# Patient Record
Sex: FEMALE | Race: BLACK OR AFRICAN AMERICAN | NOT HISPANIC OR LATINO | URBAN - METROPOLITAN AREA
[De-identification: names, ages, dates, MRNs, and addresses within clinical notes are randomized per-mention and may not be internally consistent; named-entity substitution may affect disease eponyms.]

---

## 2018-02-02 ENCOUNTER — EMERGENCY (EMERGENCY)
Facility: HOSPITAL | Age: 68
LOS: 0 days | Discharge: HOME | End: 2018-02-02
Attending: EMERGENCY MEDICINE
Payer: MEDICARE

## 2018-02-02 VITALS
OXYGEN SATURATION: 97 % | DIASTOLIC BLOOD PRESSURE: 58 MMHG | TEMPERATURE: 99 F | RESPIRATION RATE: 17 BRPM | HEART RATE: 69 BPM | SYSTOLIC BLOOD PRESSURE: 132 MMHG

## 2018-02-02 VITALS
HEIGHT: 63 IN | OXYGEN SATURATION: 100 % | DIASTOLIC BLOOD PRESSURE: 61 MMHG | HEART RATE: 66 BPM | WEIGHT: 139.99 LBS | SYSTOLIC BLOOD PRESSURE: 135 MMHG

## 2018-02-02 DIAGNOSIS — E11.9 TYPE 2 DIABETES MELLITUS WITHOUT COMPLICATIONS: ICD-10-CM

## 2018-02-02 DIAGNOSIS — E78.00 PURE HYPERCHOLESTEROLEMIA, UNSPECIFIED: ICD-10-CM

## 2018-02-02 DIAGNOSIS — R07.9 CHEST PAIN, UNSPECIFIED: ICD-10-CM

## 2018-02-02 DIAGNOSIS — I10 ESSENTIAL (PRIMARY) HYPERTENSION: ICD-10-CM

## 2018-02-02 LAB
ALBUMIN SERPL ELPH-MCNC: 3.2 G/DL — LOW (ref 3.3–5)
ALP SERPL-CCNC: 64 U/L — SIGNIFICANT CHANGE UP (ref 40–120)
ALT FLD-CCNC: 31 U/L — SIGNIFICANT CHANGE UP (ref 12–78)
ANION GAP SERPL CALC-SCNC: 11 MMOL/L — SIGNIFICANT CHANGE UP (ref 5–17)
APTT BLD: 30.9 SEC — SIGNIFICANT CHANGE UP (ref 27.5–37.4)
AST SERPL-CCNC: 26 U/L — SIGNIFICANT CHANGE UP (ref 15–37)
BASOPHILS # BLD AUTO: 0.04 K/UL — SIGNIFICANT CHANGE UP (ref 0–0.2)
BASOPHILS NFR BLD AUTO: 0.7 % — SIGNIFICANT CHANGE UP (ref 0–2)
BILIRUB SERPL-MCNC: 0.3 MG/DL — SIGNIFICANT CHANGE UP (ref 0.2–1.2)
BUN SERPL-MCNC: 24 MG/DL — HIGH (ref 7–23)
CALCIUM SERPL-MCNC: 7.8 MG/DL — LOW (ref 8.5–10.1)
CHLORIDE SERPL-SCNC: 103 MMOL/L — SIGNIFICANT CHANGE UP (ref 96–108)
CK MB BLD-MCNC: 1.3 % — SIGNIFICANT CHANGE UP (ref 0–3.5)
CK MB CFR SERPL CALC: 1.7 NG/ML — SIGNIFICANT CHANGE UP (ref 0.5–3.6)
CK SERPL-CCNC: 128 U/L — SIGNIFICANT CHANGE UP (ref 26–192)
CO2 SERPL-SCNC: 23 MMOL/L — SIGNIFICANT CHANGE UP (ref 22–31)
CREAT SERPL-MCNC: 1.3 MG/DL — SIGNIFICANT CHANGE UP (ref 0.5–1.3)
EOSINOPHIL # BLD AUTO: 0.03 K/UL — SIGNIFICANT CHANGE UP (ref 0–0.5)
EOSINOPHIL NFR BLD AUTO: 0.5 % — SIGNIFICANT CHANGE UP (ref 0–6)
GLUCOSE SERPL-MCNC: 293 MG/DL — HIGH (ref 70–99)
HCT VFR BLD CALC: 31.7 % — LOW (ref 34.5–45)
HGB BLD-MCNC: 10.4 G/DL — LOW (ref 11.5–15.5)
IMM GRANULOCYTES NFR BLD AUTO: 0.2 % — SIGNIFICANT CHANGE UP (ref 0–1.5)
INR BLD: 0.95 RATIO — SIGNIFICANT CHANGE UP (ref 0.88–1.16)
LYMPHOCYTES # BLD AUTO: 1.27 K/UL — SIGNIFICANT CHANGE UP (ref 1–3.3)
LYMPHOCYTES # BLD AUTO: 20.8 % — SIGNIFICANT CHANGE UP (ref 13–44)
MCHC RBC-ENTMCNC: 28 PG — SIGNIFICANT CHANGE UP (ref 27–34)
MCHC RBC-ENTMCNC: 32.8 GM/DL — SIGNIFICANT CHANGE UP (ref 32–36)
MCV RBC AUTO: 85.4 FL — SIGNIFICANT CHANGE UP (ref 80–100)
MONOCYTES # BLD AUTO: 0.17 K/UL — SIGNIFICANT CHANGE UP (ref 0–0.9)
MONOCYTES NFR BLD AUTO: 2.8 % — SIGNIFICANT CHANGE UP (ref 2–14)
NEUTROPHILS # BLD AUTO: 4.58 K/UL — SIGNIFICANT CHANGE UP (ref 1.8–7.4)
NEUTROPHILS NFR BLD AUTO: 75 % — SIGNIFICANT CHANGE UP (ref 43–77)
NRBC # BLD: 0 /100 WBCS — SIGNIFICANT CHANGE UP (ref 0–0)
PLATELET # BLD AUTO: 204 K/UL — SIGNIFICANT CHANGE UP (ref 150–400)
POTASSIUM SERPL-MCNC: 4.6 MMOL/L — SIGNIFICANT CHANGE UP (ref 3.5–5.3)
POTASSIUM SERPL-SCNC: 4.6 MMOL/L — SIGNIFICANT CHANGE UP (ref 3.5–5.3)
PROT SERPL-MCNC: 7 GM/DL — SIGNIFICANT CHANGE UP (ref 6–8.3)
PROTHROM AB SERPL-ACNC: 10.3 SEC — SIGNIFICANT CHANGE UP (ref 9.8–12.7)
RBC # BLD: 3.71 M/UL — LOW (ref 3.8–5.2)
RBC # FLD: 13.3 % — SIGNIFICANT CHANGE UP (ref 10.3–14.5)
SODIUM SERPL-SCNC: 137 MMOL/L — SIGNIFICANT CHANGE UP (ref 135–145)
TROPONIN I SERPL-MCNC: 0.01 NG/ML — SIGNIFICANT CHANGE UP (ref 0.01–0.04)
TROPONIN I SERPL-MCNC: <.015 NG/ML — SIGNIFICANT CHANGE UP (ref 0.01–0.04)
WBC # BLD: 6.1 K/UL — SIGNIFICANT CHANGE UP (ref 3.8–10.5)
WBC # FLD AUTO: 6.1 K/UL — SIGNIFICANT CHANGE UP (ref 3.8–10.5)

## 2018-02-02 PROCEDURE — 71045 X-RAY EXAM CHEST 1 VIEW: CPT | Mod: 26

## 2018-02-02 PROCEDURE — 93010 ELECTROCARDIOGRAM REPORT: CPT

## 2018-02-02 PROCEDURE — 99285 EMERGENCY DEPT VISIT HI MDM: CPT

## 2018-02-02 RX ORDER — INSULIN ASPART 100 [IU]/ML
0 INJECTION, SOLUTION SUBCUTANEOUS
Qty: 0 | Refills: 0 | COMMUNITY

## 2018-02-02 RX ORDER — PANTOPRAZOLE SODIUM 20 MG/1
40 TABLET, DELAYED RELEASE ORAL ONCE
Qty: 0 | Refills: 0 | Status: COMPLETED | OUTPATIENT
Start: 2018-02-02 | End: 2018-02-02

## 2018-02-02 RX ORDER — KETOROLAC TROMETHAMINE 30 MG/ML
30 SYRINGE (ML) INJECTION ONCE
Qty: 0 | Refills: 0 | Status: DISCONTINUED | OUTPATIENT
Start: 2018-02-02 | End: 2018-02-02

## 2018-02-02 RX ORDER — IBUPROFEN 200 MG
1 TABLET ORAL
Qty: 15 | Refills: 0
Start: 2018-02-02 | End: 2018-02-06

## 2018-02-02 RX ORDER — INSULIN GLARGINE 100 [IU]/ML
0 INJECTION, SOLUTION SUBCUTANEOUS
Qty: 0 | Refills: 0 | COMMUNITY

## 2018-02-02 RX ADMIN — Medication 30 MILLIGRAM(S): at 10:06

## 2018-02-02 RX ADMIN — PANTOPRAZOLE SODIUM 40 MILLIGRAM(S): 20 TABLET, DELAYED RELEASE ORAL at 10:06

## 2018-02-02 RX ADMIN — Medication 30 MILLIGRAM(S): at 10:51

## 2018-02-02 NOTE — ED PROVIDER NOTE - PROGRESS NOTE DETAILS
Pt is alert and oriented x 3 smiling denies headache, dizziness, sob, chest pain, nausea, vomiting, abd pain. Pt is given and explained all test reports and advised to see her doctor and Dr. Batista cardiologist as soon as possible.

## 2018-02-02 NOTE — ED PROVIDER NOTE - OBJECTIVE STATEMENT
67 years old female by ems c/o constant left side pressure pain 67 years old female by ems c/o constant left side pressure pain 5 to 10/10 with sob since 3:00 am this morning. Pt sts the chest pain in 5/10 now and sts the left side chest pain increases to move her left arm. Pt denies recent hx of trauma, headache, 67 years old female by ems c/o constant left side pressure pain 5 to 10/10 with sob since 3:00 am this morning. Pt sts the chest pain in 5/10 now and sts the left side chest pain increases to move her left arm. Pt denies recent hx of trauma, headache, dizziness, blurred visions, light sensitivities, cough, nausea, vomiting, fever, chills, abd pain, dysuria, or irregular bowel movements.

## 2018-02-02 NOTE — ED PROVIDER NOTE - PHYSICAL EXAMINATION
right chest wall in nontender to palp but + tender to palp, left chest wall and increases left chest pain with arms wrapped around the anterior chest

## 2018-02-02 NOTE — ED PROVIDER NOTE - CONSTITUTIONAL, MLM
normal... Well appearing, well nourished, awake, alert, oriented to person, place, time/situation and in no apparent distress. Speaking in clear full sentences no nasal flaring no shoulders retractions not holding her chest, appears very comfortable lying in the stretcher in the bright light room

## 2018-02-02 NOTE — ED ADULT TRIAGE NOTE - CHIEF COMPLAINT QUOTE
ems states, "pt c/o of chest pain like a pressure and sob that woke her up , pt given 325asa, and 1 nitro sublingual " 18gauge iv access to left ac

## 2018-04-20 ENCOUNTER — EMERGENCY (EMERGENCY)
Facility: HOSPITAL | Age: 68
LOS: 0 days | Discharge: HOME | End: 2018-04-21
Attending: EMERGENCY MEDICINE
Payer: MEDICARE

## 2018-04-20 VITALS
WEIGHT: 147.93 LBS | OXYGEN SATURATION: 99 % | TEMPERATURE: 98 F | SYSTOLIC BLOOD PRESSURE: 137 MMHG | RESPIRATION RATE: 18 BRPM | HEIGHT: 62 IN | HEART RATE: 61 BPM | DIASTOLIC BLOOD PRESSURE: 63 MMHG

## 2018-04-20 DIAGNOSIS — E78.00 PURE HYPERCHOLESTEROLEMIA, UNSPECIFIED: ICD-10-CM

## 2018-04-20 DIAGNOSIS — Z79.1 LONG TERM (CURRENT) USE OF NON-STEROIDAL ANTI-INFLAMMATORIES (NSAID): ICD-10-CM

## 2018-04-20 DIAGNOSIS — E11.649 TYPE 2 DIABETES MELLITUS WITH HYPOGLYCEMIA WITHOUT COMA: ICD-10-CM

## 2018-04-20 DIAGNOSIS — E16.2 HYPOGLYCEMIA, UNSPECIFIED: ICD-10-CM

## 2018-04-20 DIAGNOSIS — I10 ESSENTIAL (PRIMARY) HYPERTENSION: ICD-10-CM

## 2018-04-20 LAB
GLUCOSE BLDC GLUCOMTR-MCNC: 117 MG/DL — HIGH (ref 70–99)
GLUCOSE BLDC GLUCOMTR-MCNC: 133 MG/DL — HIGH (ref 70–99)
GLUCOSE BLDC GLUCOMTR-MCNC: 61 MG/DL — LOW (ref 70–99)

## 2018-04-20 PROCEDURE — 99284 EMERGENCY DEPT VISIT MOD MDM: CPT

## 2018-04-20 NOTE — ED ADULT TRIAGE NOTE - CHIEF COMPLAINT QUOTE
Pt hx DM presents to ED for hypoglycemia. Pt FS was 45 In the field. Pt received amp D50.  at triage. Pt took 16 units of Lantus and states "I didn't eat much today."

## 2018-04-20 NOTE — ED ADULT NURSE NOTE - OBJECTIVE STATEMENT
BIBA for having low blood sugar, 43 by ems, was given amp d50, patient became responsive. Patient currently aox3, denies complaint. Patient appears comfortable.

## 2018-04-21 VITALS
TEMPERATURE: 98 F | OXYGEN SATURATION: 98 % | DIASTOLIC BLOOD PRESSURE: 44 MMHG | RESPIRATION RATE: 18 BRPM | SYSTOLIC BLOOD PRESSURE: 115 MMHG | HEART RATE: 67 BPM

## 2018-04-21 PROBLEM — E78.00 PURE HYPERCHOLESTEROLEMIA, UNSPECIFIED: Chronic | Status: ACTIVE | Noted: 2018-02-02

## 2018-04-21 PROBLEM — E11.9 TYPE 2 DIABETES MELLITUS WITHOUT COMPLICATIONS: Chronic | Status: ACTIVE | Noted: 2018-02-02

## 2018-04-21 PROBLEM — I10 ESSENTIAL (PRIMARY) HYPERTENSION: Chronic | Status: ACTIVE | Noted: 2018-02-02

## 2018-04-21 LAB
ALBUMIN SERPL ELPH-MCNC: 3.3 G/DL — SIGNIFICANT CHANGE UP (ref 3.3–5)
ALP SERPL-CCNC: 62 U/L — SIGNIFICANT CHANGE UP (ref 40–120)
ALT FLD-CCNC: 26 U/L — SIGNIFICANT CHANGE UP (ref 12–78)
ANION GAP SERPL CALC-SCNC: 7 MMOL/L — SIGNIFICANT CHANGE UP (ref 5–17)
AST SERPL-CCNC: 20 U/L — SIGNIFICANT CHANGE UP (ref 15–37)
BASOPHILS # BLD AUTO: 0.06 K/UL — SIGNIFICANT CHANGE UP (ref 0–0.2)
BASOPHILS NFR BLD AUTO: 1 % — SIGNIFICANT CHANGE UP (ref 0–2)
BILIRUB SERPL-MCNC: 0.5 MG/DL — SIGNIFICANT CHANGE UP (ref 0.2–1.2)
BUN SERPL-MCNC: 26 MG/DL — HIGH (ref 7–23)
CALCIUM SERPL-MCNC: 8.6 MG/DL — SIGNIFICANT CHANGE UP (ref 8.5–10.1)
CHLORIDE SERPL-SCNC: 112 MMOL/L — HIGH (ref 96–108)
CO2 SERPL-SCNC: 25 MMOL/L — SIGNIFICANT CHANGE UP (ref 22–31)
CREAT SERPL-MCNC: 1.43 MG/DL — HIGH (ref 0.5–1.3)
EOSINOPHIL # BLD AUTO: 0.11 K/UL — SIGNIFICANT CHANGE UP (ref 0–0.5)
EOSINOPHIL NFR BLD AUTO: 1.8 % — SIGNIFICANT CHANGE UP (ref 0–6)
GLUCOSE BLDC GLUCOMTR-MCNC: 194 MG/DL — HIGH (ref 70–99)
GLUCOSE SERPL-MCNC: 78 MG/DL — SIGNIFICANT CHANGE UP (ref 70–99)
HCT VFR BLD CALC: 32.7 % — LOW (ref 34.5–45)
HGB BLD-MCNC: 10.2 G/DL — LOW (ref 11.5–15.5)
IMM GRANULOCYTES NFR BLD AUTO: 0.2 % — SIGNIFICANT CHANGE UP (ref 0–1.5)
LYMPHOCYTES # BLD AUTO: 1.59 K/UL — SIGNIFICANT CHANGE UP (ref 1–3.3)
LYMPHOCYTES # BLD AUTO: 26.4 % — SIGNIFICANT CHANGE UP (ref 13–44)
MCHC RBC-ENTMCNC: 27.6 PG — SIGNIFICANT CHANGE UP (ref 27–34)
MCHC RBC-ENTMCNC: 31.2 GM/DL — LOW (ref 32–36)
MCV RBC AUTO: 88.6 FL — SIGNIFICANT CHANGE UP (ref 80–100)
MONOCYTES # BLD AUTO: 0.37 K/UL — SIGNIFICANT CHANGE UP (ref 0–0.9)
MONOCYTES NFR BLD AUTO: 6.1 % — SIGNIFICANT CHANGE UP (ref 2–14)
NEUTROPHILS # BLD AUTO: 3.89 K/UL — SIGNIFICANT CHANGE UP (ref 1.8–7.4)
NEUTROPHILS NFR BLD AUTO: 64.5 % — SIGNIFICANT CHANGE UP (ref 43–77)
NRBC # BLD: 0 /100 WBCS — SIGNIFICANT CHANGE UP (ref 0–0)
PLATELET # BLD AUTO: 186 K/UL — SIGNIFICANT CHANGE UP (ref 150–400)
POTASSIUM SERPL-MCNC: 4.1 MMOL/L — SIGNIFICANT CHANGE UP (ref 3.5–5.3)
POTASSIUM SERPL-SCNC: 4.1 MMOL/L — SIGNIFICANT CHANGE UP (ref 3.5–5.3)
PROT SERPL-MCNC: 7.1 GM/DL — SIGNIFICANT CHANGE UP (ref 6–8.3)
RBC # BLD: 3.69 M/UL — LOW (ref 3.8–5.2)
RBC # FLD: 13.4 % — SIGNIFICANT CHANGE UP (ref 10.3–14.5)
SODIUM SERPL-SCNC: 144 MMOL/L — SIGNIFICANT CHANGE UP (ref 135–145)
WBC # BLD: 6.03 K/UL — SIGNIFICANT CHANGE UP (ref 3.8–10.5)
WBC # FLD AUTO: 6.03 K/UL — SIGNIFICANT CHANGE UP (ref 3.8–10.5)

## 2018-04-21 NOTE — ED PROVIDER NOTE - OBJECTIVE STATEMENT
Pt is a 66 yo lady with a pmhx of HTN, HL, IDDM2 who presents to the ED with hypoglycemia. She took 20 U novolog around 7 PM, and did not eat full dinner. Started feeling warm, lightheaded, FS in 40s at home. Given d50. Now is feeling well, no recent fevers, cough, dysuria. No abdominal pain, no chest pain.

## 2018-04-21 NOTE — ED PROVIDER NOTE - PROGRESS NOTE DETAILS
Pt has remained stable during ED, ok for d/c. Will take caution regarding insulin use, will junito w pmd.

## 2019-05-08 ENCOUNTER — EMERGENCY (EMERGENCY)
Facility: HOSPITAL | Age: 69
LOS: 0 days | Discharge: HOME | End: 2019-05-08
Attending: EMERGENCY MEDICINE
Payer: MEDICARE

## 2019-05-08 VITALS
OXYGEN SATURATION: 99 % | HEIGHT: 62 IN | RESPIRATION RATE: 18 BRPM | DIASTOLIC BLOOD PRESSURE: 72 MMHG | SYSTOLIC BLOOD PRESSURE: 120 MMHG | WEIGHT: 139.99 LBS | HEART RATE: 60 BPM | TEMPERATURE: 99 F

## 2019-05-08 VITALS
OXYGEN SATURATION: 99 % | RESPIRATION RATE: 16 BRPM | DIASTOLIC BLOOD PRESSURE: 75 MMHG | SYSTOLIC BLOOD PRESSURE: 121 MMHG | HEART RATE: 66 BPM | TEMPERATURE: 98 F

## 2019-05-08 DIAGNOSIS — E11.9 TYPE 2 DIABETES MELLITUS WITHOUT COMPLICATIONS: ICD-10-CM

## 2019-05-08 DIAGNOSIS — R51 HEADACHE: ICD-10-CM

## 2019-05-08 DIAGNOSIS — K08.89 OTHER SPECIFIED DISORDERS OF TEETH AND SUPPORTING STRUCTURES: ICD-10-CM

## 2019-05-08 DIAGNOSIS — I10 ESSENTIAL (PRIMARY) HYPERTENSION: ICD-10-CM

## 2019-05-08 DIAGNOSIS — M54.2 CERVICALGIA: ICD-10-CM

## 2019-05-08 PROCEDURE — 99283 EMERGENCY DEPT VISIT LOW MDM: CPT

## 2019-05-08 RX ORDER — LIDOCAINE 4 G/100G
5 CREAM TOPICAL ONCE
Qty: 0 | Refills: 0 | Status: COMPLETED | OUTPATIENT
Start: 2019-05-08 | End: 2019-05-08

## 2019-05-08 RX ORDER — ACETAMINOPHEN 500 MG
650 TABLET ORAL ONCE
Qty: 0 | Refills: 0 | Status: COMPLETED | OUTPATIENT
Start: 2019-05-08 | End: 2019-05-08

## 2019-05-08 RX ADMIN — Medication 650 MILLIGRAM(S): at 14:00

## 2019-05-08 RX ADMIN — LIDOCAINE 5 MILLILITER(S): 4 CREAM TOPICAL at 14:22

## 2019-05-08 NOTE — ED PROVIDER NOTE - PHYSICAL EXAMINATION
Gen: Alert, Well appearing. NAD    Head: NC, AT, PERRL, EOMI, normal lids/conjunctiva   ENT: patent oropharynx without erythema/exudate, uvula midline, + upper and lower aligners. no trismus, no ludwigs.  Neck: supple, no tenderness/meningismus  Pulm: Bilateral clear BS, normal resp effort, no wheeze/stridor/retractions  CV: RRR, no M/R/G, +dist pulses   Abd: soft, NT/ND, +BS, no guarding/rebound tenderness  Mskel: no edema/erythema/cyanosis   Skin: no rash   Neuro: AAOx3, no sensory/motor deficits, CN 2-12 intact

## 2019-05-08 NOTE — ED ADULT NURSE NOTE - OBJECTIVE STATEMENT
Pti s a 68YOF who is here for pain in her mouth, pt states the pain in her mouth got worse after a dental cleaning.

## 2019-05-08 NOTE — ED ADULT NURSE REASSESSMENT NOTE - NS ED NURSE REASSESS COMMENT FT1
Pt able to ambulate safely and steadily w/out assistance, denies dizziness/weakness upon standing, pt discharged home and paperwork was signed, reviewed with patient. Vital Signs recorded in the EMR, pt given follow up instructions and discharge treatment plan. Pt education deemed successful at time of discharge after teach back proves proficiency. Pt has no distress at time of discharge, pt provided discharge instructions, follow up care and results reviewed by MD. Reinforced by the RN at time of discharge.

## 2019-05-08 NOTE — ED PROVIDER NOTE - CLINICAL SUMMARY MEDICAL DECISION MAKING FREE TEXT BOX
pt nontoxic, likely benign headache, had CT last month for similar headache. likely also component of dental pain, for viscous lidocaine. dc to fu with dental and neuro.

## 2019-05-08 NOTE — ED PROVIDER NOTE - OBJECTIVE STATEMENT
67yo female with pmh DM, HTN presents with dental pain after dentist visit with anesthesia, cleaning, 6 days ago. States then goes to generalized headache and neck pain. Pt had similar headache last month and received a CT and told she needed to see dentist. pt also did fu with neuro who states everything was okay. no fever, change in vision, dizziness, cp, sob, palpitations. headache resolves with tylenol but starts up again.    ROS: No fever/chills. No photophobia/eye pain/changes in vision, No ear pain/sore throat/dysphagia, No chest pain/palpitations. No SOB/cough/stridor. No abdominal pain, N/V/D, no black/bloody bm. No dysuria/frequency/discharge, + headache. No Dizziness.  No rash.  No numbness/tingling/weakness.

## 2019-05-08 NOTE — ED ADULT NURSE NOTE - NSIMPLEMENTINTERV_GEN_ALL_ED
Implemented All Universal Safety Interventions:  [place] to call system. Call bell, personal items and telephone within reach. Instruct patient to call for assistance. Room bathroom lighting operational. Non-slip footwear when patient is off stretcher. Physically safe environment: no spills, clutter or unnecessary equipment. Stretcher in lowest position, wheels locked, appropriate side rails in place.

## 2020-01-04 ENCOUNTER — INPATIENT (INPATIENT)
Facility: HOSPITAL | Age: 70
LOS: 3 days | Discharge: HOME | End: 2020-01-08
Attending: STUDENT IN AN ORGANIZED HEALTH CARE EDUCATION/TRAINING PROGRAM | Admitting: STUDENT IN AN ORGANIZED HEALTH CARE EDUCATION/TRAINING PROGRAM
Payer: MEDICARE

## 2020-01-04 VITALS
RESPIRATION RATE: 16 BRPM | SYSTOLIC BLOOD PRESSURE: 154 MMHG | HEIGHT: 62 IN | OXYGEN SATURATION: 98 % | DIASTOLIC BLOOD PRESSURE: 59 MMHG | WEIGHT: 147.93 LBS | TEMPERATURE: 98 F | HEART RATE: 72 BPM

## 2020-01-04 LAB
ALBUMIN SERPL ELPH-MCNC: 3.4 G/DL — SIGNIFICANT CHANGE UP (ref 3.3–5)
ALP SERPL-CCNC: 55 U/L — SIGNIFICANT CHANGE UP (ref 40–120)
ALT FLD-CCNC: 38 U/L — SIGNIFICANT CHANGE UP (ref 12–78)
ANION GAP SERPL CALC-SCNC: 6 MMOL/L — SIGNIFICANT CHANGE UP (ref 5–17)
AST SERPL-CCNC: 23 U/L — SIGNIFICANT CHANGE UP (ref 15–37)
BILIRUB SERPL-MCNC: 0.4 MG/DL — SIGNIFICANT CHANGE UP (ref 0.2–1.2)
BUN SERPL-MCNC: 18 MG/DL — SIGNIFICANT CHANGE UP (ref 7–23)
CALCIUM SERPL-MCNC: 8.6 MG/DL — SIGNIFICANT CHANGE UP (ref 8.5–10.1)
CHLORIDE SERPL-SCNC: 107 MMOL/L — SIGNIFICANT CHANGE UP (ref 96–108)
CO2 SERPL-SCNC: 26 MMOL/L — SIGNIFICANT CHANGE UP (ref 22–31)
CREAT SERPL-MCNC: 1.42 MG/DL — HIGH (ref 0.5–1.3)
D DIMER BLD IA.RAPID-MCNC: 239 NG/ML DDU — HIGH
GLUCOSE BLDC GLUCOMTR-MCNC: 178 MG/DL — HIGH (ref 70–99)
GLUCOSE SERPL-MCNC: 181 MG/DL — HIGH (ref 70–99)
HCT VFR BLD CALC: 27.5 % — LOW (ref 34.5–45)
HGB BLD-MCNC: 8.8 G/DL — LOW (ref 11.5–15.5)
INR BLD: 1.04 RATIO — SIGNIFICANT CHANGE UP (ref 0.88–1.16)
MCHC RBC-ENTMCNC: 27.7 PG — SIGNIFICANT CHANGE UP (ref 27–34)
MCHC RBC-ENTMCNC: 32 GM/DL — SIGNIFICANT CHANGE UP (ref 32–36)
MCV RBC AUTO: 86.5 FL — SIGNIFICANT CHANGE UP (ref 80–100)
NRBC # BLD: 0 /100 WBCS — SIGNIFICANT CHANGE UP (ref 0–0)
NT-PROBNP SERPL-SCNC: 778 PG/ML — HIGH (ref 0–125)
PLATELET # BLD AUTO: 232 K/UL — SIGNIFICANT CHANGE UP (ref 150–400)
POTASSIUM SERPL-MCNC: 4.2 MMOL/L — SIGNIFICANT CHANGE UP (ref 3.5–5.3)
POTASSIUM SERPL-SCNC: 4.2 MMOL/L — SIGNIFICANT CHANGE UP (ref 3.5–5.3)
PROT SERPL-MCNC: 7.1 GM/DL — SIGNIFICANT CHANGE UP (ref 6–8.3)
PROTHROM AB SERPL-ACNC: 11.7 SEC — SIGNIFICANT CHANGE UP (ref 10–12.9)
RBC # BLD: 3.18 M/UL — LOW (ref 3.8–5.2)
RBC # FLD: 13.4 % — SIGNIFICANT CHANGE UP (ref 10.3–14.5)
SODIUM SERPL-SCNC: 139 MMOL/L — SIGNIFICANT CHANGE UP (ref 135–145)
TROPONIN I SERPL-MCNC: <.015 NG/ML — SIGNIFICANT CHANGE UP (ref 0.01–0.04)
WBC # BLD: 5.62 K/UL — SIGNIFICANT CHANGE UP (ref 3.8–10.5)
WBC # FLD AUTO: 5.62 K/UL — SIGNIFICANT CHANGE UP (ref 3.8–10.5)

## 2020-01-04 PROCEDURE — 99284 EMERGENCY DEPT VISIT MOD MDM: CPT

## 2020-01-04 PROCEDURE — 71275 CT ANGIOGRAPHY CHEST: CPT | Mod: 26

## 2020-01-04 PROCEDURE — 71046 X-RAY EXAM CHEST 2 VIEWS: CPT | Mod: 26

## 2020-01-04 PROCEDURE — 93010 ELECTROCARDIOGRAM REPORT: CPT

## 2020-01-04 RX ORDER — INSULIN GLARGINE 100 [IU]/ML
10 INJECTION, SOLUTION SUBCUTANEOUS
Qty: 0 | Refills: 0 | DISCHARGE

## 2020-01-04 RX ORDER — INSULIN ASPART 100 [IU]/ML
16 INJECTION, SOLUTION SUBCUTANEOUS
Qty: 0 | Refills: 0 | DISCHARGE

## 2020-01-04 NOTE — ED ADULT NURSE NOTE - ED STAT RN HANDOFF DETAILS
report taken from RN sabrina. pt c/o chest pain and SOB. Dr. cole made aware. Pt on 2L o2 via nasal cannula. will continue to monitor. awaiting to be seen by MD.

## 2020-01-04 NOTE — ED PROVIDER NOTE - OBJECTIVE STATEMENT
69 year old female with PMH of HTN, HLD, DM II presenting to ED due to SOB on exertion worse over last few days, pt thought she had fever and cough and was seen at [facility] and was sent home -noting some fluid in lungs. Pt was not placed on lasix or given cardiology follow up.

## 2020-01-04 NOTE — ED ADULT NURSE NOTE - CHIEF COMPLAINT QUOTE
pt states she is having sob , body ache and fever for one week. pt was recently seen in [facility] for fluid in the lungs .

## 2020-01-04 NOTE — ED ADULT NURSE NOTE - NSIMPLEMENTINTERV_GEN_ALL_ED
Implemented All Fall with Harm Risk Interventions:  [place] to call system. Call bell, personal items and telephone within reach. Instruct patient to call for assistance. Room bathroom lighting operational. Non-slip footwear when patient is off stretcher. Physically safe environment: no spills, clutter or unnecessary equipment. Stretcher in lowest position, wheels locked, appropriate side rails in place. Provide visual cue, wrist band, yellow gown, etc. Monitor gait and stability. Monitor for mental status changes and reorient to person, place, and time. Review medications for side effects contributing to fall risk. Reinforce activity limits and safety measures with patient and family. Provide visual clues: red socks.

## 2020-01-05 DIAGNOSIS — E78.00 PURE HYPERCHOLESTEROLEMIA, UNSPECIFIED: ICD-10-CM

## 2020-01-05 DIAGNOSIS — I50.9 HEART FAILURE, UNSPECIFIED: ICD-10-CM

## 2020-01-05 DIAGNOSIS — E11.65 TYPE 2 DIABETES MELLITUS WITH HYPERGLYCEMIA: ICD-10-CM

## 2020-01-05 DIAGNOSIS — I10 ESSENTIAL (PRIMARY) HYPERTENSION: ICD-10-CM

## 2020-01-05 DIAGNOSIS — Z29.9 ENCOUNTER FOR PROPHYLACTIC MEASURES, UNSPECIFIED: ICD-10-CM

## 2020-01-05 DIAGNOSIS — E27.8 OTHER SPECIFIED DISORDERS OF ADRENAL GLAND: ICD-10-CM

## 2020-01-05 LAB
ANION GAP SERPL CALC-SCNC: 7 MMOL/L — SIGNIFICANT CHANGE UP (ref 5–17)
BASOPHILS # BLD AUTO: 0.04 K/UL — SIGNIFICANT CHANGE UP (ref 0–0.2)
BASOPHILS NFR BLD AUTO: 0.8 % — SIGNIFICANT CHANGE UP (ref 0–2)
BUN SERPL-MCNC: 14 MG/DL — SIGNIFICANT CHANGE UP (ref 7–23)
CALCIUM SERPL-MCNC: 8.7 MG/DL — SIGNIFICANT CHANGE UP (ref 8.5–10.1)
CHLORIDE SERPL-SCNC: 106 MMOL/L — SIGNIFICANT CHANGE UP (ref 96–108)
CK SERPL-CCNC: 179 U/L — SIGNIFICANT CHANGE UP (ref 26–192)
CO2 SERPL-SCNC: 28 MMOL/L — SIGNIFICANT CHANGE UP (ref 22–31)
CREAT SERPL-MCNC: 1.28 MG/DL — SIGNIFICANT CHANGE UP (ref 0.5–1.3)
EOSINOPHIL # BLD AUTO: 0.16 K/UL — SIGNIFICANT CHANGE UP (ref 0–0.5)
EOSINOPHIL NFR BLD AUTO: 3.4 % — SIGNIFICANT CHANGE UP (ref 0–6)
FERRITIN SERPL-MCNC: 62 NG/ML — SIGNIFICANT CHANGE UP (ref 15–150)
FOLATE SERPL-MCNC: 19.7 NG/ML — SIGNIFICANT CHANGE UP
GLUCOSE BLDC GLUCOMTR-MCNC: 139 MG/DL — HIGH (ref 70–99)
GLUCOSE SERPL-MCNC: 151 MG/DL — HIGH (ref 70–99)
HBA1C BLD-MCNC: 6.8 % — HIGH (ref 4–5.6)
HCT VFR BLD CALC: 26.9 % — LOW (ref 34.5–45)
HGB BLD-MCNC: 8.6 G/DL — LOW (ref 11.5–15.5)
IMM GRANULOCYTES NFR BLD AUTO: 0.6 % — SIGNIFICANT CHANGE UP (ref 0–1.5)
IRON SATN MFR SERPL: 16 % — SIGNIFICANT CHANGE UP (ref 14–50)
IRON SATN MFR SERPL: 42 UG/DL — SIGNIFICANT CHANGE UP (ref 30–160)
LDH SERPL L TO P-CCNC: 290 U/L — HIGH (ref 50–242)
LYMPHOCYTES # BLD AUTO: 1.14 K/UL — SIGNIFICANT CHANGE UP (ref 1–3.3)
LYMPHOCYTES # BLD AUTO: 24.1 % — SIGNIFICANT CHANGE UP (ref 13–44)
MAGNESIUM SERPL-MCNC: 2.2 MG/DL — SIGNIFICANT CHANGE UP (ref 1.6–2.6)
MCHC RBC-ENTMCNC: 28 PG — SIGNIFICANT CHANGE UP (ref 27–34)
MCHC RBC-ENTMCNC: 32 GM/DL — SIGNIFICANT CHANGE UP (ref 32–36)
MCV RBC AUTO: 87.6 FL — SIGNIFICANT CHANGE UP (ref 80–100)
MONOCYTES # BLD AUTO: 0.44 K/UL — SIGNIFICANT CHANGE UP (ref 0–0.9)
MONOCYTES NFR BLD AUTO: 9.3 % — SIGNIFICANT CHANGE UP (ref 2–14)
NEUTROPHILS # BLD AUTO: 2.92 K/UL — SIGNIFICANT CHANGE UP (ref 1.8–7.4)
NEUTROPHILS NFR BLD AUTO: 61.8 % — SIGNIFICANT CHANGE UP (ref 43–77)
NRBC # BLD: 0 /100 WBCS — SIGNIFICANT CHANGE UP (ref 0–0)
NT-PROBNP SERPL-SCNC: 932 PG/ML — HIGH (ref 0–125)
PHOSPHATE SERPL-MCNC: 4.5 MG/DL — SIGNIFICANT CHANGE UP (ref 2.5–4.5)
PLATELET # BLD AUTO: 216 K/UL — SIGNIFICANT CHANGE UP (ref 150–400)
POTASSIUM SERPL-MCNC: 3.9 MMOL/L — SIGNIFICANT CHANGE UP (ref 3.5–5.3)
POTASSIUM SERPL-SCNC: 3.9 MMOL/L — SIGNIFICANT CHANGE UP (ref 3.5–5.3)
RBC # BLD: 3.07 M/UL — LOW (ref 3.8–5.2)
RBC # FLD: 13.6 % — SIGNIFICANT CHANGE UP (ref 10.3–14.5)
SODIUM SERPL-SCNC: 141 MMOL/L — SIGNIFICANT CHANGE UP (ref 135–145)
T3 SERPL-MCNC: 95 NG/DL — SIGNIFICANT CHANGE UP (ref 80–200)
T4 AB SER-ACNC: 6.8 UG/DL — SIGNIFICANT CHANGE UP (ref 4.6–12)
TIBC SERPL-MCNC: 267 UG/DL — SIGNIFICANT CHANGE UP (ref 220–430)
TROPONIN I SERPL-MCNC: <.015 NG/ML — SIGNIFICANT CHANGE UP (ref 0.01–0.04)
TSH SERPL-MCNC: 2.27 UIU/ML — SIGNIFICANT CHANGE UP (ref 0.36–3.74)
UIBC SERPL-MCNC: 225 UG/DL — SIGNIFICANT CHANGE UP (ref 110–370)
VIT B12 SERPL-MCNC: 495 PG/ML — SIGNIFICANT CHANGE UP (ref 232–1245)
WBC # BLD: 4.73 K/UL — SIGNIFICANT CHANGE UP (ref 3.8–10.5)
WBC # FLD AUTO: 4.73 K/UL — SIGNIFICANT CHANGE UP (ref 3.8–10.5)

## 2020-01-05 PROCEDURE — 99223 1ST HOSP IP/OBS HIGH 75: CPT | Mod: AI

## 2020-01-05 PROCEDURE — 12345: CPT | Mod: NC

## 2020-01-05 RX ORDER — ENOXAPARIN SODIUM 100 MG/ML
40 INJECTION SUBCUTANEOUS DAILY
Refills: 0 | Status: DISCONTINUED | OUTPATIENT
Start: 2020-01-05 | End: 2020-01-08

## 2020-01-05 RX ORDER — DOXAZOSIN MESYLATE 4 MG
1 TABLET ORAL
Qty: 0 | Refills: 0 | DISCHARGE

## 2020-01-05 RX ORDER — INSULIN LISPRO 100/ML
VIAL (ML) SUBCUTANEOUS
Refills: 0 | Status: DISCONTINUED | OUTPATIENT
Start: 2020-01-05 | End: 2020-01-08

## 2020-01-05 RX ORDER — DOXAZOSIN MESYLATE 4 MG
2 TABLET ORAL AT BEDTIME
Refills: 0 | Status: DISCONTINUED | OUTPATIENT
Start: 2020-01-05 | End: 2020-01-08

## 2020-01-05 RX ORDER — ACETAMINOPHEN 500 MG
650 TABLET ORAL ONCE
Refills: 0 | Status: COMPLETED | OUTPATIENT
Start: 2020-01-05 | End: 2020-01-05

## 2020-01-05 RX ORDER — SODIUM CHLORIDE 9 MG/ML
1000 INJECTION, SOLUTION INTRAVENOUS
Refills: 0 | Status: DISCONTINUED | OUTPATIENT
Start: 2020-01-05 | End: 2020-01-08

## 2020-01-05 RX ORDER — LANOLIN ALCOHOL/MO/W.PET/CERES
3 CREAM (GRAM) TOPICAL AT BEDTIME
Refills: 0 | Status: DISCONTINUED | OUTPATIENT
Start: 2020-01-05 | End: 2020-01-08

## 2020-01-05 RX ORDER — HYDRALAZINE HCL 50 MG
1 TABLET ORAL
Qty: 0 | Refills: 0 | DISCHARGE

## 2020-01-05 RX ORDER — SIMVASTATIN 20 MG/1
20 TABLET, FILM COATED ORAL AT BEDTIME
Refills: 0 | Status: DISCONTINUED | OUTPATIENT
Start: 2020-01-05 | End: 2020-01-08

## 2020-01-05 RX ORDER — INSULIN LISPRO 100/ML
5 VIAL (ML) SUBCUTANEOUS
Refills: 0 | Status: DISCONTINUED | OUTPATIENT
Start: 2020-01-05 | End: 2020-01-08

## 2020-01-05 RX ORDER — AMLODIPINE BESYLATE 2.5 MG/1
0 TABLET ORAL
Qty: 0 | Refills: 0 | DISCHARGE

## 2020-01-05 RX ORDER — SIMVASTATIN 20 MG/1
20 TABLET, FILM COATED ORAL
Qty: 0 | Refills: 0 | DISCHARGE

## 2020-01-05 RX ORDER — ASPIRIN/CALCIUM CARB/MAGNESIUM 324 MG
1 TABLET ORAL
Qty: 0 | Refills: 0 | DISCHARGE

## 2020-01-05 RX ORDER — ASPIRIN/CALCIUM CARB/MAGNESIUM 324 MG
81 TABLET ORAL DAILY
Refills: 0 | Status: DISCONTINUED | OUTPATIENT
Start: 2020-01-05 | End: 2020-01-08

## 2020-01-05 RX ORDER — CARVEDILOL PHOSPHATE 80 MG/1
1 CAPSULE, EXTENDED RELEASE ORAL
Qty: 0 | Refills: 0 | DISCHARGE

## 2020-01-05 RX ORDER — AMLODIPINE BESYLATE 2.5 MG/1
10 TABLET ORAL
Qty: 0 | Refills: 0 | DISCHARGE

## 2020-01-05 RX ORDER — DEXTROSE 50 % IN WATER 50 %
12.5 SYRINGE (ML) INTRAVENOUS ONCE
Refills: 0 | Status: DISCONTINUED | OUTPATIENT
Start: 2020-01-05 | End: 2020-01-08

## 2020-01-05 RX ORDER — INSULIN GLARGINE 100 [IU]/ML
20 INJECTION, SOLUTION SUBCUTANEOUS AT BEDTIME
Refills: 0 | Status: DISCONTINUED | OUTPATIENT
Start: 2020-01-05 | End: 2020-01-06

## 2020-01-05 RX ORDER — SIMVASTATIN 20 MG/1
0 TABLET, FILM COATED ORAL
Qty: 0 | Refills: 0 | DISCHARGE

## 2020-01-05 RX ORDER — CARVEDILOL PHOSPHATE 80 MG/1
25 CAPSULE, EXTENDED RELEASE ORAL EVERY 12 HOURS
Refills: 0 | Status: DISCONTINUED | OUTPATIENT
Start: 2020-01-05 | End: 2020-01-08

## 2020-01-05 RX ORDER — DEXTROSE 50 % IN WATER 50 %
25 SYRINGE (ML) INTRAVENOUS ONCE
Refills: 0 | Status: DISCONTINUED | OUTPATIENT
Start: 2020-01-05 | End: 2020-01-08

## 2020-01-05 RX ORDER — FUROSEMIDE 40 MG
40 TABLET ORAL ONCE
Refills: 0 | Status: COMPLETED | OUTPATIENT
Start: 2020-01-05 | End: 2020-01-05

## 2020-01-05 RX ORDER — HYDRALAZINE HCL 50 MG
50 TABLET ORAL EVERY 8 HOURS
Refills: 0 | Status: DISCONTINUED | OUTPATIENT
Start: 2020-01-05 | End: 2020-01-08

## 2020-01-05 RX ORDER — GLUCAGON INJECTION, SOLUTION 0.5 MG/.1ML
1 INJECTION, SOLUTION SUBCUTANEOUS ONCE
Refills: 0 | Status: DISCONTINUED | OUTPATIENT
Start: 2020-01-05 | End: 2020-01-08

## 2020-01-05 RX ORDER — FUROSEMIDE 40 MG
20 TABLET ORAL DAILY
Refills: 0 | Status: DISCONTINUED | OUTPATIENT
Start: 2020-01-05 | End: 2020-01-06

## 2020-01-05 RX ORDER — AMLODIPINE BESYLATE 2.5 MG/1
10 TABLET ORAL DAILY
Refills: 0 | Status: DISCONTINUED | OUTPATIENT
Start: 2020-01-05 | End: 2020-01-08

## 2020-01-05 RX ORDER — DEXTROSE 50 % IN WATER 50 %
15 SYRINGE (ML) INTRAVENOUS ONCE
Refills: 0 | Status: DISCONTINUED | OUTPATIENT
Start: 2020-01-05 | End: 2020-01-08

## 2020-01-05 RX ORDER — LOSARTAN POTASSIUM 100 MG/1
0 TABLET, FILM COATED ORAL
Qty: 0 | Refills: 0 | DISCHARGE

## 2020-01-05 RX ADMIN — Medication 650 MILLIGRAM(S): at 16:37

## 2020-01-05 RX ADMIN — CARVEDILOL PHOSPHATE 25 MILLIGRAM(S): 80 CAPSULE, EXTENDED RELEASE ORAL at 05:45

## 2020-01-05 RX ADMIN — Medication 50 MILLIGRAM(S): at 21:44

## 2020-01-05 RX ADMIN — Medication 5 UNIT(S): at 08:19

## 2020-01-05 RX ADMIN — Medication 2: at 21:47

## 2020-01-05 RX ADMIN — INSULIN GLARGINE 20 UNIT(S): 100 INJECTION, SOLUTION SUBCUTANEOUS at 21:45

## 2020-01-05 RX ADMIN — SIMVASTATIN 20 MILLIGRAM(S): 20 TABLET, FILM COATED ORAL at 21:44

## 2020-01-05 RX ADMIN — Medication 40 MILLIGRAM(S): at 00:41

## 2020-01-05 RX ADMIN — CARVEDILOL PHOSPHATE 25 MILLIGRAM(S): 80 CAPSULE, EXTENDED RELEASE ORAL at 17:25

## 2020-01-05 RX ADMIN — Medication 2 MILLIGRAM(S): at 21:42

## 2020-01-05 RX ADMIN — ENOXAPARIN SODIUM 40 MILLIGRAM(S): 100 INJECTION SUBCUTANEOUS at 11:18

## 2020-01-05 RX ADMIN — Medication 5 UNIT(S): at 17:25

## 2020-01-05 RX ADMIN — Medication 50 MILLIGRAM(S): at 13:23

## 2020-01-05 RX ADMIN — AMLODIPINE BESYLATE 10 MILLIGRAM(S): 2.5 TABLET ORAL at 05:45

## 2020-01-05 RX ADMIN — Medication 3 MILLIGRAM(S): at 21:44

## 2020-01-05 RX ADMIN — Medication 50 MILLIGRAM(S): at 05:45

## 2020-01-05 RX ADMIN — Medication 650 MILLIGRAM(S): at 17:30

## 2020-01-05 RX ADMIN — Medication 3 MILLIGRAM(S): at 03:32

## 2020-01-05 RX ADMIN — Medication 81 MILLIGRAM(S): at 11:17

## 2020-01-05 RX ADMIN — Medication 5 UNIT(S): at 11:57

## 2020-01-05 NOTE — CONSULT NOTE ADULT - SUBJECTIVE AND OBJECTIVE BOX
HPI:  HPI:  Pt is a 69 year old female with PMH of HTN, HLD, DM II presenting to ED for SOB on exertion worse over last few days, pt thought she had fever and cough and was seen at [facility] ed w/o f/u few days ago. Pt reports  for past several months she has been more fatigued and SOB. over the past 2 weeks she began having LE edema. and over the past 1 week she noticed Orthopnea. Additionally she reports not sleeping for the past 4 days, unable to specify why. Pt denies any fever, chills,cp, palpitations, n/v/d/c no travels or sick contacts. (2020 07:05)      Chief Complaint:  Patient is a 69y old  Female who presents with a chief complaint of sob (2020 07:05)      Review of Systems:    General:  No wt loss, fevers, chills, night sweats  Eyes:  Good vision, no reported pain  ENT:  No sore throat, pain, runny nose, dysphagia  CV:  No pain, palpitations, hypo/hypertension  Resp:   dyspnea  GI:  No pain, nausea, vomiting, diarrhea, constipation           Social History/Family History  SOCHX:   tobacco,  -  alcohol    FMHX: FA/MO  - contributory       Discussed with:  PMD, Family    Physical Exam:    Vital Signs:  Vital Signs Last 24 Hrs  T(C): 36.3 (2020 16:35), Max: 37.2 (2020 01:24)  T(F): 97.3 (2020 16:35), Max: 99 (2020 01:24)  HR: 64 (2020 16:35) (60 - 77)  BP: 147/55 (2020 16:35) (125/63 - 160/62)  BP(mean): --  RR: 17 (2020 16:35) (16 - 20)  SpO2: 97% (2020 16:35) (95% - 99%)  Daily Height in cm: 157.48 (2020 02:20)    Daily Weight in k.8 (2020 05:19)  I&O's Summary    2020 07:01  -  2020 22:39  --------------------------------------------------------  IN: 750 mL / OUT: 0 mL / NET: 750 mL          Chest:  Full & symmetric excursion, no increased effort, breath sounds clear  Cardiovascular:  Regular rhythm, S1, S2, no murmur/rub/S3/S4, no carotid/femoral/abdominal bruit, radial/pedal pulses 2+,  Abdomen:  Soft, non-tender, non-distended, normoactive bowel sounds, no HSM      Laboratory:                          8.6    4.73  )-----------( 216      ( 2020 06:26 )             26.9     01-05    141  |  106  |  14  ----------------------------<  151<H>  3.9   |  28  |  1.28    Ca    8.7      2020 06:26  Phos  4.5     -05  Mg     2.2     -    TPro  7.1  /  Alb  3.4  /  TBili  0.4  /  DBili  x   /  AST  23  /  ALT  38  /  AlkPhos  55  01-04      CARDIAC MARKERS ( 2020 09:54 )  <.015 ng/mL / x     / 179 U/L / x     / x      CARDIAC MARKERS ( 2020 06:26 )  .019 ng/mL / x     / x     / x     / x      CARDIAC MARKERS ( 2020 18:09 )  <.015 ng/mL / x     / x     / x     / x          CAPILLARY BLOOD GLUCOSE      POCT Blood Glucose.: 175 mg/dL (2020 21:41)  POCT Blood Glucose.: 102 mg/dL (2020 17:24)  POCT Blood Glucose.: 129 mg/dL (2020 11:49)  POCT Blood Glucose.: 139 mg/dL (2020 08:04)    LIVER FUNCTIONS - ( 2020 18:09 )  Alb: 3.4 g/dL / Pro: 7.1 gm/dL / ALK PHOS: 55 U/L / ALT: 38 U/L / AST: 23 U/L / GGT: x           PT/INR - ( 2020 18:09 )   PT: 11.7 sec;   INR: 1.04 ratio          Assessment:  I am asked to assess this patient with shortness of breath  the patient is also noted to have viral illness  Anemia is also evident  The patient has a mildly elevated BNP, cardiac enzymes remained normal  Lasix continues with some improvement  A diagnostic echocardiogram is ordered

## 2020-01-05 NOTE — CONSULT NOTE ADULT - SUBJECTIVE AND OBJECTIVE BOX
REASON FOR Tele-evaluation    SUBJECTIVE: sob, body ache and fever for one week.    ED HPI: 69 year old female with PMH of HTN, HLD, DM II presenting to ED due to SOB on exertion worse over last few days, pt thought she had fever and cough and was seen at [facility] and was sent home -noting some fluid in lungs. Pt was not placed on lasix or given cardiology follow up.  Above ED HPI reviewed and noted: patient confirms above, adds that for past several months she has been more fatigued and SOB. over the past 2 weeks she began having LE edema. and over the past 1 week she noticed Orthopnea. Additionally she reports not sleeping for the past 4 days, unable to specify why. She denies: GIB, changes in stool color, abdominal pain, nausea or vomiting. To her recollection she was never told that she has heart failure.     OBJECTIVE  ROS:  noted above.     PHYSICAL EXAM: Tele-evaluation precludes physical exam. found resting comfortable in bed, no acute distress. able to speak in full sentences.     Vital Signs Last 24 Hrs  T(C): 36.2 (04 Jan 2020 23:14), Max: 36.8 (04 Jan 2020 17:18)  T(F): 97.2 (04 Jan 2020 23:14), Max: 98.2 (04 Jan 2020 17:18)  HR: 75 (04 Jan 2020 23:14) (70 - 75)  BP: 160/62 (04 Jan 2020 23:14) (148/58 - 160/62)  BP(mean): --  RR: 20 (04 Jan 2020 23:14) (16 - 20)  SpO2: 95% (04 Jan 2020 23:14) (95% - 98%)                            8.8    5.62  )-----------( 232      ( 04 Jan 2020 18:09 )             27.5     01-04    139  |  107  |  18  ----------------------------<  181<H>  4.2   |  26  |  1.42<H>    Ca    8.6      04 Jan 2020 18:09    TPro  7.1  /  Alb  3.4  /  TBili  0.4  /  DBili  x   /  AST  23  /  ALT  38  /  AlkPhos  55  01-04    CARDIAC MARKERS ( 04 Jan 2020 18:09 )  <.015 ng/mL / x     / x     / x     / x              PT/INR - ( 04 Jan 2020 18:09 )   PT: 11.7 sec;   INR: 1.04 ratio      CTA chest:   FINDINGS:    LUNGS AND AIRWAYS: Patent central airways.  Mild interlobular septal thickening and diffuse scattered foci of groundglass airspace opacity. 3 mm left upper lobe pulmonary nodule.    PLEURA: Small-to-moderate right and small left pleural effusions.    MEDIASTINUM AND DILMA: Few nonspecific small volume mediastinal nodes are not well evaluated.    VESSELS: Suboptimal opacification of the pulmonary arteries secondary to transient interruption of the contrast column as well as significant respiratory motion limiting evaluation of the segmental and subsegmental pulmonary arteries. No central pulmonary embolus. Atherosclerosis of the thoracic aorta which is otherwise normal in caliber.    HEART: Cardiomegaly. No pericardial effusion.    CHEST WALL AND LOWER NECK: Within normal limits.    VISUALIZED UPPER ABDOMEN: Indeterminant 1.2 cm right adrenal gland nodule.    BONES: Multilevel degenerative changes of the spine.    IMPRESSION:     Mild interlobular septal thickening and a few scattered groundglass airspace opacities, which may be seen in setting of mild CHF.    Small-to-moderate right and small left pleural effusions.    3 mm left upper lobe pulmonary nodule. In the absence of known risk factors, no further routine follow-up is recommended.    Indeterminant 1.2 cm right adrenal gland nodule. Further evaluation with nonemergent MRI of the abdomen may be obtained, if no contraindications exist.              ASSESSMENT AND PLAN:   New onset HF with bilateral pleural effusion worse on right, symptomatic. Pulmonary Embolism ruled out.   -	Admit to telemetry   -	ECHO  -	Please consult cardiology  -	s/p Lasix 40mg IVP  -	Supplemental oxygen as need for sao2<90%  -	daily weight monitoring  H/O Anemia, down trending, now 8.8/27 from 10.4/31  -	f/u stool Guaiac   -	Iron Panel, LDH, thyroid panel   Diabetes T2, insulin dependent; RISS, accu check, Hypoglycemia protocol   -	Cont Lantus 20units hs and Novolog 5  -	f/u A1C; ADA diet.   H/O Hypercholesteremia  cont Simvastatin 20mg daily, f/u lipid panel   H/O Hypertension cont home medication with holding parameters  - cont amlodipine 10mg daily : Carvedilol 25mg BID; Hydralazine 50mg TID ; Doxazosin 2mg daily and Chlorthalidone 25mg daily     Incidental findings on CT of Indeterminant 1.2 cm right adrenal gland nodule. nonemergent MRI of the abdomen recommended. f/u with PCP Immi  DVT prophylaxis: VCD    Care plan discussed with (***) REASON FOR Tele-evaluation    SUBJECTIVE: sob, body ache and fever for one week.    ED HPI: 69 year old female with PMH of HTN, HLD, DM II presenting to ED due to SOB on exertion worse over last few days, pt thought she had fever and cough and was seen at [facility] and was sent home -noting some fluid in lungs. Pt was not placed on lasix or given cardiology follow up.  Above ED HPI reviewed and noted: patient confirms above, adds that for past several months she has been more fatigued and SOB. over the past 2 weeks she began having LE edema. and over the past 1 week she noticed Orthopnea. Additionally she reports not sleeping for the past 4 days, unable to specify why. She denies: GIB, changes in stool color, abdominal pain, nausea or vomiting. To her recollection she was never told that she has heart failure.     OBJECTIVE  ROS:  noted above.     PHYSICAL EXAM: Tele-evaluation precludes physical exam. found resting comfortable in bed, no acute distress. able to speak in full sentences.     Vital Signs Last 24 Hrs  T(C): 36.2 (04 Jan 2020 23:14), Max: 36.8 (04 Jan 2020 17:18)  T(F): 97.2 (04 Jan 2020 23:14), Max: 98.2 (04 Jan 2020 17:18)  HR: 75 (04 Jan 2020 23:14) (70 - 75)  BP: 160/62 (04 Jan 2020 23:14) (148/58 - 160/62)  BP(mean): --  RR: 20 (04 Jan 2020 23:14) (16 - 20)  SpO2: 95% (04 Jan 2020 23:14) (95% - 98%)                            8.8    5.62  )-----------( 232      ( 04 Jan 2020 18:09 )             27.5     01-04    139  |  107  |  18  ----------------------------<  181<H>  4.2   |  26  |  1.42<H>    Ca    8.6      04 Jan 2020 18:09    TPro  7.1  /  Alb  3.4  /  TBili  0.4  /  DBili  x   /  AST  23  /  ALT  38  /  AlkPhos  55  01-04    CARDIAC MARKERS ( 04 Jan 2020 18:09 )  <.015 ng/mL / x     / x     / x     / x              PT/INR - ( 04 Jan 2020 18:09 )   PT: 11.7 sec;   INR: 1.04 ratio      CTA chest:   FINDINGS:    LUNGS AND AIRWAYS: Patent central airways.  Mild interlobular septal thickening and diffuse scattered foci of groundglass airspace opacity. 3 mm left upper lobe pulmonary nodule.    PLEURA: Small-to-moderate right and small left pleural effusions.    MEDIASTINUM AND DILMA: Few nonspecific small volume mediastinal nodes are not well evaluated.    VESSELS: Suboptimal opacification of the pulmonary arteries secondary to transient interruption of the contrast column as well as significant respiratory motion limiting evaluation of the segmental and subsegmental pulmonary arteries. No central pulmonary embolus. Atherosclerosis of the thoracic aorta which is otherwise normal in caliber.    HEART: Cardiomegaly. No pericardial effusion.    CHEST WALL AND LOWER NECK: Within normal limits.    VISUALIZED UPPER ABDOMEN: Indeterminant 1.2 cm right adrenal gland nodule.    BONES: Multilevel degenerative changes of the spine.    IMPRESSION:     Mild interlobular septal thickening and a few scattered groundglass airspace opacities, which may be seen in setting of mild CHF.    Small-to-moderate right and small left pleural effusions.    3 mm left upper lobe pulmonary nodule. In the absence of known risk factors, no further routine follow-up is recommended.    Indeterminant 1.2 cm right adrenal gland nodule. Further evaluation with nonemergent MRI of the abdomen may be obtained, if no contraindications exist.              ASSESSMENT AND PLAN:   New onset HF with bilateral pleural effusion worse on right, symptomatic. Pulmonary Embolism ruled out.   -	Admit to telemetry   -	ECHO  -	Please consult cardiology  -	s/p Lasix 40mg IVP  -	Supplemental oxygen as need for sao2<90%  -	daily weight monitoring  H/O Anemia, down trending, now 8.8/27 from 10.4/31  -	f/u stool Guaiac   -	Iron Panel, LDH, thyroid panel   Diabetes T2, insulin dependent; RISS, accu check, Hypoglycemia protocol   -	Cont Lantus 20units hs and Novolog 5  -	f/u A1C; ADA diet.   H/O Hypercholesteremia  cont Simvastatin 20mg daily, f/u lipid panel   H/O Hypertension cont home medication with holding parameters  - cont amlodipine 10mg daily : Carvedilol 25mg BID; Hydralazine 50mg TID ; Doxazosin 2mg daily. Held Chlorthalidone 25mg daily (unable to place order)     Incidental findings on CT of Indeterminant 1.2 cm right adrenal gland nodule. nonemergent MRI of the abdomen recommended. f/u with PCP Immi  DVT prophylaxis: VCD    Care plan discussed with Dr. Mcallister

## 2020-01-05 NOTE — PATIENT PROFILE ADULT - NSPROMUTINFOINDIVIDFT_GEN_A_NUR
Pt has no preferences. Pt is very quiet. Happy to be here because she knows she will get the help she needs.

## 2020-01-05 NOTE — H&P ADULT - NSHPLABSRESULTS_GEN_ALL_CORE
LABS:                        8.6    4.73  )-----------( 216      ( 05 Jan 2020 06:26 )             26.9     01-05    141  |  106  |  14  ----------------------------<  151<H>  3.9   |  28  |  1.28    Ca    8.7      05 Jan 2020 06:26  Phos  4.5     01-05  Mg     2.2     01-05    TPro  7.1  /  Alb  3.4  /  TBili  0.4  /  DBili  x   /  AST  23  /  ALT  38  /  AlkPhos  55  01-04    PT/INR - ( 04 Jan 2020 18:09 )   PT: 11.7 sec;   INR: 1.04 ratio             CAPILLARY BLOOD GLUCOSE      POCT Blood Glucose.: 178 mg/dL (04 Jan 2020 18:01)        RADIOLOGY & ADDITIONAL TESTS:    Imaging Personally Reviewed:  [ X] YES  [ ] NO

## 2020-01-05 NOTE — H&P ADULT - NSHPPHYSICALEXAM_GEN_ALL_CORE
Vital Signs Last 24 Hrs  T(C): 37.1 (05 Jan 2020 05:19), Max: 37.2 (05 Jan 2020 01:24)  T(F): 98.8 (05 Jan 2020 05:19), Max: 99 (05 Jan 2020 01:24)  HR: 68 (05 Jan 2020 05:19) (68 - 77)  BP: 136/57 (05 Jan 2020 05:19) (136/57 - 160/62)  BP(mean): --  RR: 18 (05 Jan 2020 05:19) (16 - 20)  SpO2: 97% (05 Jan 2020 05:19) (95% - 99%)    PHYSICAL EXAM:    GENERAL: NAD, well-groomed, well-developed  HEAD:  Atraumatic, Normocephalic  EYES: EOMI, PERRLA, conjunctiva and sclera clear  ENMT: No tonsillar erythema, exudates, or enlargement; Moist mucous membranes, No lesions  NECK: Supple, No JVD, Normal thyroid  NERVOUS SYSTEM:  Alert & Oriented X3, Good concentration; Motor Strength 5/5 B/L upper and lower extremities; DTRs 2+ intact and symmetric  CHEST/LUNG: decreased bs at bases otherwise good air movement, no wheezing  HEART: Regular rate and rhythm; No rubs, or gallops, +S1,S2, 3/6 murmur  ABDOMEN: Soft, Nontender, Nondistended; Bowel sounds present  EXTREMITIES:  2+ Peripheral Pulses, No clubbing, cyanosis, 1+ pedal edema  LYMPH: No cervical adenopathy  RECTAL: deferred  BREAST: deferred  : deferred  SKIN: No rashes or lesions    IMPROVE VTE Individual Risk Assessment        RISK                                                          Points  [  ] Previous VTE                                                3  [  ] Thrombophilia                                             2  [  ] Lower limb paralysis                                    2        (unable to hold up >15 seconds)    [  ] Current Cancer                                             2         (within 6 months)  [ x ] Immobilization > 24 hrs                              1  [  ] ICU/CCU stay > 24 hours                            1  [  x] Age > 60                                                    1  IMPROVE VTE Score _____2____

## 2020-01-05 NOTE — CHART NOTE - NSCHARTNOTEFT_GEN_A_CORE
Patient seen and examined bedside.       Vital Signs Last 24 Hrs  T(C): 37.1 (05 Jan 2020 05:19), Max: 37.2 (05 Jan 2020 01:24)  T(F): 98.8 (05 Jan 2020 05:19), Max: 99 (05 Jan 2020 01:24)  HR: 68 (05 Jan 2020 05:19) (68 - 77)  BP: 136/57 (05 Jan 2020 05:19) (136/57 - 160/62)  BP(mean): --  RR: 18 (05 Jan 2020 05:19) (16 - 20)  SpO2: 97% (05 Jan 2020 05:19) (95% - 99%)      GENERAL: NAD well-developed  HEAD:  Atraumatic, Normocephalic  EYES: EOMI, PERRLA, conjunctiva and sclera clear  ENMT: No tonsillar erythema, exudates, or enlargement; Moist mucous membranes, Good dentition, No lesions  NECK: Supple, No JVD, Normal thyroid  NERVOUS SYSTEM:  Alert & Oriented X3, Good concentration; Motor Strength 5/5 B/L upper and lower extremities; DTRs 2+ intact and symmetric  CHEST/LUNG: Clear to percussion bilaterally; No rales, rhonchi, wheezing, or rubs  HEART: Regular rate and rhythm; No murmurs, rubs, or gallops  ABDOMEN: Soft, Nontender, Nondistended; Bowel sounds present  EXTREMITIES:  2+ Peripheral Pulses, No clubbing, cyanosis, or edema  LYMPH: No lymphadenopathy   SKIN: No rashes or lesions      Labs and imaging reviewed,                        8.6    4.73  )-----------( 216      ( 05 Jan 2020 06:26 )             26.9   01-05    141  |  106  |  14  ----------------------------<  151<H>  3.9   |  28  |  1.28    Ca    8.7      05 Jan 2020 06:26  Phos  4.5     01-05  Mg     2.2     01-05    TPro  7.1  /  Alb  3.4  /  TBili  0.4  /  DBili  x   /  AST  23  /  ALT  38  /  AlkPhos  55  01-04    < from: CT Angio Chest w/ IV Cont (01.04.20 @ 21:53) >    IMPRESSION:     Mild interlobular septal thickening and a few scattered groundglass airspace opacities, which may be seen in setting of mild CHF.    Small-to-moderate right and small left pleural effusions.    3 mm left upper lobe pulmonary nodule. In the absence of known risk factors, no further routine follow-up is recommended.    Indeterminant 1.2 cm right adrenal gland nodule. Further evaluation with nonemergent MRI of the abdomen may be obtained, if no contraindications exist.    < end of copied text >    EKG:    Assessment and Plan:    -cardiology consult --Dr. Sheehan 69 year old female with PMH of HTN, HLD, DM II presenting to ED for SOB on exertion worse over last few days, pt thought she had fever and cough and was seen at [facility] ed w/o f/u few days ago. Pt reports  for past several months she has been more fatigued and SOB. over the past 2 weeks she began having LE edema. and over the past 1 week she noticed Orthopnea.     Patient seen and examined bedside.   +exertional SOB for past few months   +LE edema which she states improved with lasix   +orthopnea     Denies fever, chills, N/V, dizziness, HA, cough, CP, palpitations, abdominal pain, dysuria, diarrhea, constipation.       Vital Signs Last 24 Hrs  T(C): 37.1 (05 Jan 2020 05:19), Max: 37.2 (05 Jan 2020 01:24)  T(F): 98.8 (05 Jan 2020 05:19), Max: 99 (05 Jan 2020 01:24)  HR: 68 (05 Jan 2020 05:19) (68 - 77)  BP: 136/57 (05 Jan 2020 05:19) (136/57 - 160/62)  BP(mean): --  RR: 18 (05 Jan 2020 05:19) (16 - 20)  SpO2: 97% (05 Jan 2020 05:19) (95% - 99%)      GENERAL: NAD, speaking in full sentences  HEAD:  Atraumatic, Normocephalic  EYES: EOMI, PERRLA, conjunctiva and sclera clear  ENMT: No tonsillar erythema, exudates, or enlargement; Moist mucous membranes  NECK: Supple, No JVD  NERVOUS SYSTEM:  Alert & Oriented X3, Good concentration; no focal neuro deficits   CHEST/LUNG: decreased BS at the bases b/l. good b/l air entry   HEART: Regular rate and rhythm; No murmurs, rubs, or gallops  ABDOMEN: Soft, Nontender, Nondistended; Bowel sounds present  EXTREMITIES:  2+ Peripheral Pulses b/l, No clubbing, cyanosis, or edema b/l       Labs and imaging reviewed,                        8.6    4.73  )-----------( 216      ( 05 Jan 2020 06:26 )             26.9   01-05    141  |  106  |  14  ----------------------------<  151<H>  3.9   |  28  |  1.28    Ca    8.7      05 Jan 2020 06:26  Phos  4.5     01-05  Mg     2.2     01-05    TPro  7.1  /  Alb  3.4  /  TBili  0.4  /  DBili  x   /  AST  23  /  ALT  38  /  AlkPhos  55  01-04    CARDIAC MARKERS ( 05 Jan 2020 09:54 )  <.015 ng/mL / x     / 179 U/L / x     / x      CARDIAC MARKERS ( 05 Jan 2020 06:26 )  .019 ng/mL / x     / x     / x     / x      CARDIAC MARKERS ( 04 Jan 2020 18:09 )  <.015 ng/mL / x     / x     / x     / x            < from: CT Angio Chest w/ IV Cont (01.04.20 @ 21:53) >    IMPRESSION:     Mild interlobular septal thickening and a few scattered groundglass airspace opacities, which may be seen in setting of mild CHF.    Small-to-moderate right and small left pleural effusions.    3 mm left upper lobe pulmonary nodule. In the absence of known risk factors, no further routine follow-up is recommended.    Indeterminant 1.2 cm right adrenal gland nodule. Further evaluation with nonemergent MRI of the abdomen may be obtained, if no contraindications exist.    < end of copied text >        EKG: NSR, nonspecific TW abnormalities     Assessment and Plan:  RULING OUT NEW ONSET CHF   was given 1 dose Lasix 40mg IVP  TSH wnl   -cardiology consult --Dr. Sheehan  follow ECHO   supplemental O2 prn to maintain SpO2 >92%    PULMONARY EMBOLISM RULED OUT     INCIDENTAL FINDINGS OF 1.2CM ADRENAL NODULE AND 3MM DEVAN PULMONARY NODULE --will need outpatient follow-up and work-up of adrenal nodule     Dm2 , A1c Hemoglobin A1C, Whole Blood: 6.8  -c/w lantus and lispro   carb consistent diet , FS goal 140-180     HLD --c/w statin     Essential HTN --c/w home meds (chlorthalidone was held)    NORMOCYTIC ANEMIA - anemia work-up appears iron deficiency anemia,   no e/o of bruising or bleeding. patient denies melena or brbpr.   supplement ferrous sulfate   patient may need GI eval outpatient , unclear when last colonoscopy   monitor H/H  transfuse prn if Hgb <7 or HCT <21 or patient symptomatic     Preventative measures --  lovenox SQ-dvt ppx  general precautions 69 year old female with PMH of HTN, HLD, DM II presenting to ED for SOB on exertion worse over last few days, pt thought she had fever and cough and was seen at [facility] ed w/o f/u few days ago. Pt reports  for past several months she has been more fatigued and SOB. over the past 2 weeks she began having LE edema. and over the past 1 week she noticed Orthopnea.     Patient seen and examined bedside.   +exertional SOB for past few months   +LE edema which she states improved with lasix   +orthopnea     Denies fever, chills, N/V, dizziness, HA, cough, CP, palpitations, abdominal pain, dysuria, diarrhea, constipation.       Vital Signs Last 24 Hrs  T(C): 37.1 (05 Jan 2020 05:19), Max: 37.2 (05 Jan 2020 01:24)  T(F): 98.8 (05 Jan 2020 05:19), Max: 99 (05 Jan 2020 01:24)  HR: 68 (05 Jan 2020 05:19) (68 - 77)  BP: 136/57 (05 Jan 2020 05:19) (136/57 - 160/62)  BP(mean): --  RR: 18 (05 Jan 2020 05:19) (16 - 20)  SpO2: 97% (05 Jan 2020 05:19) (95% - 99%)      GENERAL: NAD, speaking in full sentences  HEAD:  Atraumatic, Normocephalic  EYES: EOMI, PERRLA, conjunctiva and sclera clear  ENMT: No tonsillar erythema, exudates, or enlargement; Moist mucous membranes  NECK: Supple, No JVD  NERVOUS SYSTEM:  Alert & Oriented X3, Good concentration; no focal neuro deficits   CHEST/LUNG: decreased BS at the bases b/l. good b/l air entry   HEART: Regular rate and rhythm; No murmurs, rubs, or gallops  ABDOMEN: Soft, Nontender, Nondistended; Bowel sounds present  EXTREMITIES:  2+ Peripheral Pulses b/l, No clubbing, cyanosis, or edema b/l       Labs and imaging reviewed,                        8.6    4.73  )-----------( 216      ( 05 Jan 2020 06:26 )             26.9   01-05    141  |  106  |  14  ----------------------------<  151<H>  3.9   |  28  |  1.28    Ca    8.7      05 Jan 2020 06:26  Phos  4.5     01-05  Mg     2.2     01-05    TPro  7.1  /  Alb  3.4  /  TBili  0.4  /  DBili  x   /  AST  23  /  ALT  38  /  AlkPhos  55  01-04    CARDIAC MARKERS ( 05 Jan 2020 09:54 )  <.015 ng/mL / x     / 179 U/L / x     / x      CARDIAC MARKERS ( 05 Jan 2020 06:26 )  .019 ng/mL / x     / x     / x     / x      CARDIAC MARKERS ( 04 Jan 2020 18:09 )  <.015 ng/mL / x     / x     / x     / x            < from: CT Angio Chest w/ IV Cont (01.04.20 @ 21:53) >    IMPRESSION:     Mild interlobular septal thickening and a few scattered groundglass airspace opacities, which may be seen in setting of mild CHF.    Small-to-moderate right and small left pleural effusions.    3 mm left upper lobe pulmonary nodule. In the absence of known risk factors, no further routine follow-up is recommended.    Indeterminant 1.2 cm right adrenal gland nodule. Further evaluation with nonemergent MRI of the abdomen may be obtained, if no contraindications exist.    < end of copied text >        EKG: NSR, nonspecific TW abnormalities     Assessment and Plan:  RULING OUT NEW ONSET CHF   was given 1 dose Lasix 40mg IVP  TSH wnl   Trop neg x 3   -cardiology consult --Dr. Sheehan  follow ECHO   supplemental O2 prn to maintain SpO2 >92%    PULMONARY EMBOLISM RULED OUT     INCIDENTAL FINDINGS OF 1.2CM ADRENAL NODULE AND 3MM DEVAN PULMONARY NODULE --will need outpatient follow-up and work-up of adrenal nodule     Dm2 , A1c Hemoglobin A1C, Whole Blood: 6.8  -c/w lantus and lispro   carb consistent diet , FS goal 140-180     HLD --c/w statin     Essential HTN --c/w home meds (chlorthalidone was held)    NORMOCYTIC ANEMIA - anemia work-up appears iron deficiency anemia,   no e/o of bruising or bleeding. patient denies melena or brbpr.   supplement ferrous sulfate   patient may need GI eval outpatient , unclear when last colonoscopy   monitor H/H  transfuse prn if Hgb <7 or HCT <21 or patient symptomatic     Preventative measures --  lovenox SQ-dvt ppx  general precautions

## 2020-01-05 NOTE — H&P ADULT - HISTORY OF PRESENT ILLNESS
Pt is a 69 year old female with PMH of HTN, HLD, DM II presenting to ED for SOB on exertion worse over last few days, pt thought she had fever and cough and was seen at [facility] ed w/o f/u few days ago. Pt reports  for past several months she has been more fatigued and SOB. over the past 2 weeks she began having LE edema. and over the past 1 week she noticed Orthopnea. Additionally she reports not sleeping for the past 4 days, unable to specify why. Pt denies any fever, chills,cp, palpitations, n/v/d/c no travels or sick contacts.

## 2020-01-06 LAB
ANION GAP SERPL CALC-SCNC: 7 MMOL/L — SIGNIFICANT CHANGE UP (ref 5–17)
BUN SERPL-MCNC: 15 MG/DL — SIGNIFICANT CHANGE UP (ref 7–23)
CALCIUM SERPL-MCNC: 8.1 MG/DL — LOW (ref 8.5–10.1)
CHLORIDE SERPL-SCNC: 106 MMOL/L — SIGNIFICANT CHANGE UP (ref 96–108)
CO2 SERPL-SCNC: 29 MMOL/L — SIGNIFICANT CHANGE UP (ref 22–31)
CREAT SERPL-MCNC: 1.4 MG/DL — HIGH (ref 0.5–1.3)
GLUCOSE BLDC GLUCOMTR-MCNC: 100 MG/DL — HIGH (ref 70–99)
GLUCOSE BLDC GLUCOMTR-MCNC: 280 MG/DL — HIGH (ref 70–99)
GLUCOSE BLDC GLUCOMTR-MCNC: 73 MG/DL — SIGNIFICANT CHANGE UP (ref 70–99)
GLUCOSE SERPL-MCNC: 66 MG/DL — LOW (ref 70–99)
HCT VFR BLD CALC: 26.5 % — LOW (ref 34.5–45)
HCV AB S/CO SERPL IA: 0.08 S/CO — SIGNIFICANT CHANGE UP (ref 0–0.99)
HCV AB SERPL-IMP: SIGNIFICANT CHANGE UP
HGB BLD-MCNC: 8.5 G/DL — LOW (ref 11.5–15.5)
MAGNESIUM SERPL-MCNC: 2.1 MG/DL — SIGNIFICANT CHANGE UP (ref 1.6–2.6)
MCHC RBC-ENTMCNC: 28 PG — SIGNIFICANT CHANGE UP (ref 27–34)
MCHC RBC-ENTMCNC: 32.1 GM/DL — SIGNIFICANT CHANGE UP (ref 32–36)
MCV RBC AUTO: 87.2 FL — SIGNIFICANT CHANGE UP (ref 80–100)
NRBC # BLD: 0 /100 WBCS — SIGNIFICANT CHANGE UP (ref 0–0)
NT-PROBNP SERPL-SCNC: 515 PG/ML — HIGH (ref 0–125)
OB PNL STL: NEGATIVE — SIGNIFICANT CHANGE UP
PHOSPHATE SERPL-MCNC: 5.6 MG/DL — HIGH (ref 2.5–4.5)
PLATELET # BLD AUTO: 221 K/UL — SIGNIFICANT CHANGE UP (ref 150–400)
POTASSIUM SERPL-MCNC: 3.8 MMOL/L — SIGNIFICANT CHANGE UP (ref 3.5–5.3)
POTASSIUM SERPL-SCNC: 3.8 MMOL/L — SIGNIFICANT CHANGE UP (ref 3.5–5.3)
RBC # BLD: 3.04 M/UL — LOW (ref 3.8–5.2)
RBC # FLD: 13.5 % — SIGNIFICANT CHANGE UP (ref 10.3–14.5)
SODIUM SERPL-SCNC: 142 MMOL/L — SIGNIFICANT CHANGE UP (ref 135–145)
WBC # BLD: 5.13 K/UL — SIGNIFICANT CHANGE UP (ref 3.8–10.5)
WBC # FLD AUTO: 5.13 K/UL — SIGNIFICANT CHANGE UP (ref 3.8–10.5)

## 2020-01-06 PROCEDURE — 93306 TTE W/DOPPLER COMPLETE: CPT | Mod: 26

## 2020-01-06 PROCEDURE — 99233 SBSQ HOSP IP/OBS HIGH 50: CPT

## 2020-01-06 RX ORDER — INSULIN GLARGINE 100 [IU]/ML
15 INJECTION, SOLUTION SUBCUTANEOUS AT BEDTIME
Refills: 0 | Status: DISCONTINUED | OUTPATIENT
Start: 2020-01-06 | End: 2020-01-08

## 2020-01-06 RX ORDER — FUROSEMIDE 40 MG
40 TABLET ORAL DAILY
Refills: 0 | Status: DISCONTINUED | OUTPATIENT
Start: 2020-01-06 | End: 2020-01-08

## 2020-01-06 RX ORDER — FUROSEMIDE 40 MG
20 TABLET ORAL DAILY
Refills: 0 | Status: DISCONTINUED | OUTPATIENT
Start: 2020-01-06 | End: 2020-01-06

## 2020-01-06 RX ADMIN — CARVEDILOL PHOSPHATE 25 MILLIGRAM(S): 80 CAPSULE, EXTENDED RELEASE ORAL at 16:48

## 2020-01-06 RX ADMIN — Medication 50 MILLIGRAM(S): at 05:25

## 2020-01-06 RX ADMIN — Medication 3 MILLIGRAM(S): at 22:19

## 2020-01-06 RX ADMIN — Medication 50 MILLIGRAM(S): at 13:27

## 2020-01-06 RX ADMIN — CARVEDILOL PHOSPHATE 25 MILLIGRAM(S): 80 CAPSULE, EXTENDED RELEASE ORAL at 05:25

## 2020-01-06 RX ADMIN — Medication 5 UNIT(S): at 08:21

## 2020-01-06 RX ADMIN — ENOXAPARIN SODIUM 40 MILLIGRAM(S): 100 INJECTION SUBCUTANEOUS at 11:52

## 2020-01-06 RX ADMIN — SIMVASTATIN 20 MILLIGRAM(S): 20 TABLET, FILM COATED ORAL at 22:19

## 2020-01-06 RX ADMIN — Medication 81 MILLIGRAM(S): at 11:52

## 2020-01-06 RX ADMIN — Medication 6: at 22:18

## 2020-01-06 RX ADMIN — Medication 50 MILLIGRAM(S): at 22:17

## 2020-01-06 RX ADMIN — AMLODIPINE BESYLATE 10 MILLIGRAM(S): 2.5 TABLET ORAL at 05:25

## 2020-01-06 RX ADMIN — Medication 20 MILLIGRAM(S): at 05:25

## 2020-01-06 RX ADMIN — Medication 5 UNIT(S): at 11:53

## 2020-01-06 RX ADMIN — INSULIN GLARGINE 15 UNIT(S): 100 INJECTION, SOLUTION SUBCUTANEOUS at 22:17

## 2020-01-06 RX ADMIN — Medication 2 MILLIGRAM(S): at 22:17

## 2020-01-06 NOTE — PROGRESS NOTE ADULT - SUBJECTIVE AND OBJECTIVE BOX
MICAH [id number]  Patient is a 69y old  Female who presents with a chief complaint of sob         Subjective: Pt seen and examined. c/o SOB on exertion.      Daily     Daily Weight in k.6 (2020 04:39)  Vital Signs Last 24 Hrs  T(C): 37.1 (2020 11:39), Max: 37.1 (2020 11:39)  T(F): 98.7 (2020 11:39), Max: 98.7 (2020 11:39)  HR: 65 (2020 11:39) (59 - 65)  BP: 127/54 (2020 11:39) (125/63 - 147/55)  BP(mean): --  RR: 16 (2020 11:39) (16 - 17)  SpO2: 99% (2020 11:39) (96% - 100%)                        8.5    5.13  )-----------( 221      ( 2020 04:42 )             26.5   01-06    142  |  106  |  15  ----------------------------<  66<L>  3.8   |  29  |  1.40<H>    Ca    8.1<L>      2020 04:42  Phos  5.6     01-06  Mg     2.1     01-06    TPro  7.1  /  Alb  3.4  /  TBili  0.4  /  DBili  x   /  AST  23  /  ALT  38  /  AlkPhos  55  01-04  PT/INR - ( 2020 18:09 )   PT: 11.7 sec;   INR: 1.04 ratio         CAPILLARY BLOOD GLUCOSE      POCT Blood Glucose.: 100 mg/dL (2020 11:51)  POCT Blood Glucose.: 92 mg/dL (2020 08:05)  POCT Blood Glucose.: 175 mg/dL (2020 21:41)  POCT Blood Glucose.: 102 mg/dL (2020 17:24)  CARDIAC MARKERS ( 2020 09:54 )  <.015 ng/mL / x     / 179 U/L / x     / x      CARDIAC MARKERS ( 2020 06:26 )  .019 ng/mL / x     / x     / x     / x      CARDIAC MARKERS ( 2020 18:09 )  <.015 ng/mL / x     / x     / x     / x            MEDICATIONS  (STANDING):  amLODIPine   Tablet 10 milliGRAM(s) Oral daily  aspirin enteric coated 81 milliGRAM(s) Oral daily  carvedilol 25 milliGRAM(s) Oral every 12 hours  dextrose 5%. 1000 milliLiter(s) (50 mL/Hr) IV Continuous <Continuous>  dextrose 50% Injectable 12.5 Gram(s) IV Push once  dextrose 50% Injectable 25 Gram(s) IV Push once  dextrose 50% Injectable 25 Gram(s) IV Push once  doxazosin 2 milliGRAM(s) Oral at bedtime  enoxaparin Injectable 40 milliGRAM(s) SubCutaneous daily  furosemide   Injectable 20 milliGRAM(s) IV Push daily  hydrALAZINE 50 milliGRAM(s) Oral every 8 hours  insulin glargine Injectable (LANTUS) 20 Unit(s) SubCutaneous at bedtime  insulin lispro (HumaLOG) corrective regimen sliding scale   SubCutaneous Before meals and at bedtime  insulin lispro Injectable (HumaLOG) 5 Unit(s) SubCutaneous three times a day before meals  melatonin 3 milliGRAM(s) Oral at bedtime  simvastatin 20 milliGRAM(s) Oral at bedtime    MEDICATIONS  (PRN):  dextrose 40% Gel 15 Gram(s) Oral once PRN Blood Glucose LESS THAN 70 milliGRAM(s)/deciliter  glucagon  Injectable 1 milliGRAM(s) IntraMuscular once PRN Glucose LESS THAN 70 milligrams/deciliter

## 2020-01-06 NOTE — PROGRESS NOTE ADULT - PROBLEM SELECTOR PLAN 5
incidental adrenal nodule, did d/w pt and recommended to f/u w/pmd and outpt mri incidental adrenal nodule, recommended to f/u w/pmd and outpt mri

## 2020-01-06 NOTE — PROGRESS NOTE ADULT - SUBJECTIVE AND OBJECTIVE BOX
69 year old female with PMH of HTN, HLD, DM II presenting to ED for SOB on exertion worse over last few days, pt thought she had fever and cough and was seen at [facility] ed w/o f/u few days ago. Pt reports  for past several months she has been more fatigued and SOB. over the past 2 weeks she began having LE edema. and over the past 1 week she noticed Orthopnea. Additionally she reports not sleeping for the past 4 days, unable to specify why. Pt denies any fever, chills,cp, palpitations, n/v/d/c no travels or sick contacts. (2020 07:05)      Chief Complaint:  Patient is a 69y old  Female who presents with a chief complaint of sob (2020 07:05)      Review of Systems:    General:  No wt loss, fevers, chills, night sweats  Eyes:  Good vision, no reported pain  ENT:  No sore throat, pain, runny nose, dysphagia  CV:  No pain, palpitations, hypo/hypertension  Resp:   dyspnea  GI:  No pain, nausea, vomiting, diarrhea, constipation           Social History/Family History  SOCHX:   tobacco,  -  alcohol    FMHX: FA/MO  - contributory       Discussed with:  PMD, Family    Physical Exam:    Vital Signs:  Vital Signs Last 24 Hrs  T(C): 36.3 (2020 16:35), Max: 37.2 (2020 01:24)  T(F): 97.3 (2020 16:35), Max: 99 (2020 01:24)  HR: 64 (2020 16:35) (60 - 77)  BP: 147/55 (2020 16:35) (125/63 - 160/62)  BP(mean): --  RR: 17 (2020 16:35) (16 - 20)  SpO2: 97% (2020 16:35) (95% - 99%)  Daily Height in cm: 157.48 (2020 02:20)    Daily Weight in k.8 (2020 05:19)  I&O's Summary    2020 07:01  -  2020 22:39  --------------------------------------------------------  IN: 750 mL / OUT: 0 mL / NET: 750 mL          Chest:  Full & symmetric excursion, no increased effort, breath sounds clear  Cardiovascular:  Regular rhythm, S1, S2, no murmur/rub/S3/S4, no carotid/femoral/abdominal bruit, radial/pedal pulses 2+,  Abdomen:  Soft, non-tender, non-distended, normoactive bowel sounds, no HSM      Laboratory:                          8.6    4.73  )-----------( 216      ( 2020 06:26 )             26.9     01-05    141  |  106  |  14  ----------------------------<  151<H>  3.9   |  28  |  1.28    Ca    8.7      2020 06:26  Phos  4.5     -05  Mg     2.2     -05    TPro  7.1  /  Alb  3.4  /  TBili  0.4  /  DBili  x   /  AST  23  /  ALT  38  /  AlkPhos  55  01-04      CARDIAC MARKERS ( 2020 09:54 )  <.015 ng/mL / x     / 179 U/L / x     / x      CARDIAC MARKERS ( 2020 06:26 )  .019 ng/mL / x     / x     / x     / x      CARDIAC MARKERS ( 2020 18:09 )  <.015 ng/mL / x     / x     / x     / x          CAPILLARY BLOOD GLUCOSE      POCT Blood Glucose.: 175 mg/dL (2020 21:41)  POCT Blood Glucose.: 102 mg/dL (2020 17:24)  POCT Blood Glucose.: 129 mg/dL (2020 11:49)  POCT Blood Glucose.: 139 mg/dL (2020 08:04)    LIVER FUNCTIONS - ( 2020 18:09 )  Alb: 3.4 g/dL / Pro: 7.1 gm/dL / ALK PHOS: 55 U/L / ALT: 38 U/L / AST: 23 U/L / GGT: x           PT/INR - ( 2020 18:09 )   PT: 11.7 sec;   INR: 1.04 ratio          Assessment:  I am asked to assess this patient with shortness of breath  the patient is also noted to have viral illness  Anemia is also evident  The patient has a mildly elevated BNP, cardiac enzymes remained normal  Lasix continues with some improvement  A diagnostic echocardiogram is ordered

## 2020-01-06 NOTE — PROGRESS NOTE ADULT - PROBLEM SELECTOR PLAN 1
on iv lasix change to PO lasix tomorrow  cardio on board  check pulse ox on ambulation. on iv lasix change to PO lasix tomorrow  f/u 2D echo.  cardio on board  check pulse ox on ambulation. on iv lasix change to PO lasix tomorrow  f/u 2D echo.  cardio on board  c/w carvedilol.  daily weights, strict I&O.  check pulse ox on ambulation.

## 2020-01-07 DIAGNOSIS — N18.9 CHRONIC KIDNEY DISEASE, UNSPECIFIED: ICD-10-CM

## 2020-01-07 LAB
ANION GAP SERPL CALC-SCNC: 7 MMOL/L — SIGNIFICANT CHANGE UP (ref 5–17)
BUN SERPL-MCNC: 16 MG/DL — SIGNIFICANT CHANGE UP (ref 7–23)
CALCIUM SERPL-MCNC: 8.1 MG/DL — LOW (ref 8.5–10.1)
CHLORIDE SERPL-SCNC: 109 MMOL/L — HIGH (ref 96–108)
CO2 SERPL-SCNC: 25 MMOL/L — SIGNIFICANT CHANGE UP (ref 22–31)
CREAT SERPL-MCNC: 1.37 MG/DL — HIGH (ref 0.5–1.3)
GLUCOSE BLDC GLUCOMTR-MCNC: 105 MG/DL — HIGH (ref 70–99)
GLUCOSE BLDC GLUCOMTR-MCNC: 167 MG/DL — HIGH (ref 70–99)
GLUCOSE BLDC GLUCOMTR-MCNC: 91 MG/DL — SIGNIFICANT CHANGE UP (ref 70–99)
GLUCOSE BLDC GLUCOMTR-MCNC: 95 MG/DL — SIGNIFICANT CHANGE UP (ref 70–99)
GLUCOSE SERPL-MCNC: 115 MG/DL — HIGH (ref 70–99)
POTASSIUM SERPL-MCNC: 3.9 MMOL/L — SIGNIFICANT CHANGE UP (ref 3.5–5.3)
POTASSIUM SERPL-SCNC: 3.9 MMOL/L — SIGNIFICANT CHANGE UP (ref 3.5–5.3)
SODIUM SERPL-SCNC: 141 MMOL/L — SIGNIFICANT CHANGE UP (ref 135–145)

## 2020-01-07 PROCEDURE — 99233 SBSQ HOSP IP/OBS HIGH 50: CPT

## 2020-01-07 RX ORDER — LATANOPROST 0.05 MG/ML
1 SOLUTION/ DROPS OPHTHALMIC; TOPICAL AT BEDTIME
Refills: 0 | Status: DISCONTINUED | OUTPATIENT
Start: 2020-01-07 | End: 2020-01-08

## 2020-01-07 RX ADMIN — INSULIN GLARGINE 15 UNIT(S): 100 INJECTION, SOLUTION SUBCUTANEOUS at 21:19

## 2020-01-07 RX ADMIN — Medication 5 UNIT(S): at 16:35

## 2020-01-07 RX ADMIN — Medication 50 MILLIGRAM(S): at 14:00

## 2020-01-07 RX ADMIN — Medication 81 MILLIGRAM(S): at 11:57

## 2020-01-07 RX ADMIN — ENOXAPARIN SODIUM 40 MILLIGRAM(S): 100 INJECTION SUBCUTANEOUS at 11:31

## 2020-01-07 RX ADMIN — CARVEDILOL PHOSPHATE 25 MILLIGRAM(S): 80 CAPSULE, EXTENDED RELEASE ORAL at 05:23

## 2020-01-07 RX ADMIN — Medication 50 MILLIGRAM(S): at 05:23

## 2020-01-07 RX ADMIN — Medication 5 UNIT(S): at 11:54

## 2020-01-07 RX ADMIN — Medication 50 MILLIGRAM(S): at 21:18

## 2020-01-07 RX ADMIN — Medication 5 UNIT(S): at 08:09

## 2020-01-07 RX ADMIN — Medication 2 MILLIGRAM(S): at 21:18

## 2020-01-07 RX ADMIN — LATANOPROST 1 DROP(S): 0.05 SOLUTION/ DROPS OPHTHALMIC; TOPICAL at 22:23

## 2020-01-07 RX ADMIN — AMLODIPINE BESYLATE 10 MILLIGRAM(S): 2.5 TABLET ORAL at 05:23

## 2020-01-07 RX ADMIN — Medication 2: at 21:18

## 2020-01-07 RX ADMIN — CARVEDILOL PHOSPHATE 25 MILLIGRAM(S): 80 CAPSULE, EXTENDED RELEASE ORAL at 17:54

## 2020-01-07 RX ADMIN — Medication 40 MILLIGRAM(S): at 05:28

## 2020-01-07 RX ADMIN — SIMVASTATIN 20 MILLIGRAM(S): 20 TABLET, FILM COATED ORAL at 21:18

## 2020-01-07 RX ADMIN — Medication 3 MILLIGRAM(S): at 21:18

## 2020-01-07 NOTE — PROGRESS NOTE ADULT - ASSESSMENT
69 year old female with PMH of HTN, HLD, DM II presented for shortness of breath.
69 year old female with PMH of HTN, HLD, DM II presented for shortness of breath.

## 2020-01-07 NOTE — DIETITIAN INITIAL EVALUATION ADULT. - PHYSICAL APPEARANCE
other (specify)/BMI=27.3(01/05/19), 01/07, 2+ edema of left foot noted, 01/05, 1+ edema of ankles noted Nutrition focused physical exam conducted; Subcutaneous fat Exam;  [ Mild  ]  Orbital fat pads region,  [ WNL  ]Buccal fat region,  [ Mild  ]triceps region, [ WNL   ]ribs region.  Muscle Exam; [ WNL  ]temples region, [ WNL  ]clavicle region, [ WNL  ]shoulder region, [  WNL ]Scapula region, [ WNL   ]Interosseous region, [ Mild  ]thigh region, [ Mild   ]Calf region

## 2020-01-07 NOTE — PROGRESS NOTE ADULT - SUBJECTIVE AND OBJECTIVE BOX
69 year old female with PMH of HTN, HLD, DM II presenting to ED for SOB on exertion worse over last few days, pt thought she had fever and cough and was seen at [facility] ed w/o f/u few days ago. Pt reports  for past several months she has been more fatigued and SOB. over the past 2 weeks she began having LE edema. and over the past 1 week she noticed Orthopnea. Additionally she reports not sleeping for the past 4 days, unable to specify why. Pt denies any fever, chills,cp, palpitations, n/v/d/c no travels or sick contacts. (2020 07:05)      Chief Complaint:  Patient is a 69y old  Female who presents with a chief complaint of sob (2020 07:05)      Review of Systems:    General:  No wt loss, fevers, chills, night sweats  Eyes:  Good vision, no reported pain  ENT:  No sore throat, pain, runny nose, dysphagia  CV:  No pain, palpitations, hypo/hypertension  Resp:   dyspnea  GI:  No pain, nausea, vomiting, diarrhea, constipation           Social History/Family History  SOCHX:   tobacco,  -  alcohol    FMHX: FA/MO  - contributory       Discussed with:  PMD, Family    Physical Exam:    Vital Signs:  Vital Signs Last 24 Hrs  T(C): 36.3 (2020 16:35), Max: 37.2 (2020 01:24)  T(F): 97.3 (2020 16:35), Max: 99 (2020 01:24)  HR: 64 (2020 16:35) (60 - 77)  BP: 147/55 (2020 16:35) (125/63 - 160/62)  BP(mean): --  RR: 17 (2020 16:35) (16 - 20)  SpO2: 97% (2020 16:35) (95% - 99%)  Daily Height in cm: 157.48 (2020 02:20)    Daily Weight in k.8 (2020 05:19)  I&O's Summary    2020 07:01  -  2020 22:39  --------------------------------------------------------  IN: 750 mL / OUT: 0 mL / NET: 750 mL          Chest:  Full & symmetric excursion, no increased effort, breath sounds clear  Cardiovascular:  Regular rhythm, S1, S2, no murmur/rub/S3/S4, no carotid/femoral/abdominal bruit, radial/pedal pulses 2+,  Abdomen:  Soft, non-tender, non-distended, normoactive bowel sounds, no HSM      Laboratory:                          8.6    4.73  )-----------( 216      ( 2020 06:26 )             26.9     01-05    141  |  106  |  14  ----------------------------<  151<H>  3.9   |  28  |  1.28    Ca    8.7      2020 06:26  Phos  4.5     -05  Mg     2.2     -05    TPro  7.1  /  Alb  3.4  /  TBili  0.4  /  DBili  x   /  AST  23  /  ALT  38  /  AlkPhos  55  01-04      CARDIAC MARKERS ( 2020 09:54 )  <.015 ng/mL / x     / 179 U/L / x     / x      CARDIAC MARKERS ( 2020 06:26 )  .019 ng/mL / x     / x     / x     / x      CARDIAC MARKERS ( 2020 18:09 )  <.015 ng/mL / x     / x     / x     / x          CAPILLARY BLOOD GLUCOSE      POCT Blood Glucose.: 175 mg/dL (2020 21:41)  POCT Blood Glucose.: 102 mg/dL (2020 17:24)  POCT Blood Glucose.: 129 mg/dL (2020 11:49)  POCT Blood Glucose.: 139 mg/dL (2020 08:04)    LIVER FUNCTIONS - ( 2020 18:09 )  Alb: 3.4 g/dL / Pro: 7.1 gm/dL / ALK PHOS: 55 U/L / ALT: 38 U/L / AST: 23 U/L / GGT: x           PT/INR - ( 2020 18:09 )   PT: 11.7 sec;   INR: 1.04 ratio          Assessment:  I am asked to assess this patient with shortness of breath  the patient is also noted to have viral illness  Anemia is also evident  The patient has a mildly elevated BNP, cardiac enzymes remained normal  Lasix continues with some improvement  A diagnostic echocardiogram is ordered an still pending

## 2020-01-07 NOTE — PROGRESS NOTE ADULT - PROBLEM SELECTOR PLAN 1
c/w lasix   f/u 2D echo.  cardio on board  c/w carvedilol.  daily weights, strict I&O.  check pulse ox on ambulation.

## 2020-01-07 NOTE — DIETITIAN INITIAL EVALUATION ADULT. - ENERGY NEEDS
Height (cm): 157.5 (01-05)  Weight (kg): 67.7 (01-05)  BMI (kg/m2): 27.3 (01-05)  IBW:  49.8 kg       % IBW:  135%           UBW:              %UBW Height (cm): 157.5 (01-05)  Weight (kg): 67.7 (01-05)  BMI (kg/m2): 27.3 (01-05)  IBW:  49.8 kg       % IBW:  135%           UBW: 67.1 kg            %UBW: 100%, edema noted, ? dry wt.

## 2020-01-07 NOTE — DIETITIAN INITIAL EVALUATION ADULT. - DIET TYPE
+ if oral intake <75% of meals, recommend add Glucerna Shake 1 x daily = 200 calories, 10 grams protein per serving/consistent carbohydrate (evening snack)/low sodium

## 2020-01-07 NOTE — DIETITIAN INITIAL EVALUATION ADULT. - OTHER INFO
Pt states to follow Low sodium , no concentrated sweets, no starchy foods., rice, potato, noodles PTA.   Pt lived c family & did own shopping/cooking PTA.  Pt was eating 2 meals/day & skipped dinner.  As per diet hx obtained, pt was using seasonings containing sodium.   Pt was on insulin glargine 10 units & Novolog 15 units 3 x day.  Pt reports hx of multiple episodes of hypoglycemia PTA, which most likely related to skipped meals & meal time insulin regimen.    RD recommended to follow up c MD/ Endocrinologist.   Pt unsure of foods containing CHO, blood glucose goals & unaware of A1C%.  Pt c improved oral intake since adm @ present.  RD educated on heart failure nutrition therapy, meal planning c plate method, blood glucose goals & ways to ensure adequate protein-energy intake.

## 2020-01-07 NOTE — DIETITIAN INITIAL EVALUATION ADULT. - PERTINENT LABORATORY DATA
01-07 Na141 mmol/L Glu 115 mg/dL<H> K+ 3.9 mmol/L Cr  1.37 mg/dL<H> BUN 16 mg/dL 01-06 Phos 5.6 mg/dL<H> 01-04 Alb 3.4 g/dL 01-05 [id number] 6.8 %<H>01-04 ALT 38 U/L AST 23 U/L Alkaline Phosphatase 55 U/L

## 2020-01-07 NOTE — PROGRESS NOTE ADULT - SUBJECTIVE AND OBJECTIVE BOX
MICAH [id number]  Patient is a 69y old  Female who presents with a chief complaint of sob        Subjective: Pt seen and examined. Denies any new complaints.      Daily     Daily Weight in k.8 (2020 05:03)  Vital Signs Last 24 Hrs  T(C): 36.9 (2020 05:03), Max: 37.1 (2020 11:39)  T(F): 98.4 (2020 05:03), Max: 98.7 (2020 11:39)  HR: 72 (2020 05:03) (65 - 72)  BP: 138/55 (2020 05:03) (127/54 - 138/55)  BP(mean): --  RR: 18 (2020 05:03) (16 - 18)  SpO2: 97% (2020 05:03) (95% - 99%)                        8.5    5.13  )-----------( 221      ( 2020 04:42 )             26.5   01-07    141  |  109<H>  |  16  ----------------------------<  115<H>  3.9   |  25  |  1.37<H>    Ca    8.1<L>      2020 07:23  Phos  5.6     01-06  Mg     2.1     01-06    CAPILLARY BLOOD GLUCOSE      POCT Blood Glucose.: 105 mg/dL (2020 08:00)  POCT Blood Glucose.: 280 mg/dL (2020 22:17)  POCT Blood Glucose.: 73 mg/dL (2020 16:47)  POCT Blood Glucose.: 100 mg/dL (2020 11:51)        MEDICATIONS  (STANDING):  amLODIPine   Tablet 10 milliGRAM(s) Oral daily  aspirin enteric coated 81 milliGRAM(s) Oral daily  carvedilol 25 milliGRAM(s) Oral every 12 hours  dextrose 5%. 1000 milliLiter(s) (50 mL/Hr) IV Continuous <Continuous>  dextrose 50% Injectable 12.5 Gram(s) IV Push once  dextrose 50% Injectable 25 Gram(s) IV Push once  dextrose 50% Injectable 25 Gram(s) IV Push once  doxazosin 2 milliGRAM(s) Oral at bedtime  enoxaparin Injectable 40 milliGRAM(s) SubCutaneous daily  furosemide    Tablet 40 milliGRAM(s) Oral daily  hydrALAZINE 50 milliGRAM(s) Oral every 8 hours  insulin glargine Injectable (LANTUS) 15 Unit(s) SubCutaneous at bedtime  insulin lispro (HumaLOG) corrective regimen sliding scale   SubCutaneous Before meals and at bedtime  insulin lispro Injectable (HumaLOG) 5 Unit(s) SubCutaneous three times a day before meals  melatonin 3 milliGRAM(s) Oral at bedtime  simvastatin 20 milliGRAM(s) Oral at bedtime    MEDICATIONS  (PRN):  dextrose 40% Gel 15 Gram(s) Oral once PRN Blood Glucose LESS THAN 70 milliGRAM(s)/deciliter  glucagon  Injectable 1 milliGRAM(s) IntraMuscular once PRN Glucose LESS THAN 70 milligrams/deciliter

## 2020-01-07 NOTE — CHART NOTE - NSCHARTNOTEFT_GEN_A_CORE
Upon Nutritional Assessment by the Registered Dietitian your patient was determined to meet criteria / has evidence of the following diagnosis/diagnoses:          [ ]  Mild Protein Calorie Malnutrition        [ x]  Moderate Protein Calorie Malnutrition( acute)        [ ] Severe Protein Calorie Malnutrition        [ ] Unspecified Protein Calorie Malnutrition        [ ] Underweight / BMI <19        [ ] Morbid Obesity / BMI > 40      Findings as based on:  •  Comprehensive nutrition assessment and consultation  •  Calorie counts (nutrient intake analysis)  •  Food acceptance and intake status from observations by staff  •  Follow up  •  Patient education  •  Intervention secondary to interdisciplinary rounds  •   concerns      Treatment:    The following diet has been recommended:  Low sodium , consistent carbohydrate c evening snack , if oral intake <75%, recommend add Glucerna Shake 1 x daily = 200 calories, 10 grams protein per serving       PROVIDER Section:     By signing this assessment you are acknowledging and agree with the diagnosis/diagnoses assigned by the Registered Dietitian    Comments:

## 2020-01-07 NOTE — DIETITIAN INITIAL EVALUATION ADULT. - PERTINENT MEDS FT
MEDICATIONS  (STANDING):  amLODIPine   Tablet 10 milliGRAM(s) Oral daily  aspirin enteric coated 81 milliGRAM(s) Oral daily  carvedilol 25 milliGRAM(s) Oral every 12 hours  dextrose 5%. 1000 milliLiter(s) (50 mL/Hr) IV Continuous <Continuous>  dextrose 50% Injectable 12.5 Gram(s) IV Push once  dextrose 50% Injectable 25 Gram(s) IV Push once  dextrose 50% Injectable 25 Gram(s) IV Push once  doxazosin 2 milliGRAM(s) Oral at bedtime  enoxaparin Injectable 40 milliGRAM(s) SubCutaneous daily  furosemide    Tablet 40 milliGRAM(s) Oral daily  hydrALAZINE 50 milliGRAM(s) Oral every 8 hours  insulin glargine Injectable (LANTUS) 15 Unit(s) SubCutaneous at bedtime  insulin lispro (HumaLOG) corrective regimen sliding scale   SubCutaneous Before meals and at bedtime  insulin lispro Injectable (HumaLOG) 5 Unit(s) SubCutaneous three times a day before meals  melatonin 3 milliGRAM(s) Oral at bedtime  simvastatin 20 milliGRAM(s) Oral at bedtime    MEDICATIONS  (PRN):  dextrose 40% Gel 15 Gram(s) Oral once PRN Blood Glucose LESS THAN 70 milliGRAM(s)/deciliter  glucagon  Injectable 1 milliGRAM(s) IntraMuscular once PRN Glucose LESS THAN 70 milligrams/deciliter

## 2020-01-08 VITALS
DIASTOLIC BLOOD PRESSURE: 56 MMHG | TEMPERATURE: 98 F | OXYGEN SATURATION: 93 % | SYSTOLIC BLOOD PRESSURE: 129 MMHG | RESPIRATION RATE: 18 BRPM | HEART RATE: 66 BPM

## 2020-01-08 LAB
GLUCOSE BLDC GLUCOMTR-MCNC: 207 MG/DL — HIGH (ref 70–99)
GLUCOSE BLDC GLUCOMTR-MCNC: 77 MG/DL — SIGNIFICANT CHANGE UP (ref 70–99)

## 2020-01-08 PROCEDURE — 99239 HOSP IP/OBS DSCHRG MGMT >30: CPT

## 2020-01-08 RX ORDER — CHLORTHALIDONE 50 MG
1 TABLET ORAL
Qty: 0 | Refills: 0 | DISCHARGE

## 2020-01-08 RX ORDER — FUROSEMIDE 40 MG
1 TABLET ORAL
Qty: 30 | Refills: 0
Start: 2020-01-08 | End: 2020-02-06

## 2020-01-08 RX ORDER — LATANOPROST 0.05 MG/ML
1 SOLUTION/ DROPS OPHTHALMIC; TOPICAL
Qty: 0 | Refills: 0 | DISCHARGE
Start: 2020-01-08

## 2020-01-08 RX ADMIN — Medication 40 MILLIGRAM(S): at 05:47

## 2020-01-08 RX ADMIN — Medication 4: at 12:01

## 2020-01-08 RX ADMIN — AMLODIPINE BESYLATE 10 MILLIGRAM(S): 2.5 TABLET ORAL at 05:47

## 2020-01-08 RX ADMIN — Medication 5 UNIT(S): at 12:00

## 2020-01-08 RX ADMIN — Medication 50 MILLIGRAM(S): at 05:47

## 2020-01-08 RX ADMIN — Medication 81 MILLIGRAM(S): at 12:01

## 2020-01-08 RX ADMIN — ENOXAPARIN SODIUM 40 MILLIGRAM(S): 100 INJECTION SUBCUTANEOUS at 12:01

## 2020-01-08 RX ADMIN — Medication 50 MILLIGRAM(S): at 16:09

## 2020-01-08 RX ADMIN — CARVEDILOL PHOSPHATE 25 MILLIGRAM(S): 80 CAPSULE, EXTENDED RELEASE ORAL at 05:47

## 2020-01-08 NOTE — DISCHARGE NOTE PROVIDER - CARE PROVIDER_API CALL
Brian Sheehan)  Cardiology  [address] [facility], [address] [place] [address]  Phone: [phone]  Fax: [phone]  Follow Up Time: 2 weeks    brittany,   Phone: (   )    -  Fax: (   )    -  Established Patient  Follow Up Time: 2 weeks

## 2020-01-08 NOTE — DISCHARGE NOTE PROVIDER - NSDCCPCAREPLAN_GEN_ALL_CORE_FT
PRINCIPAL DISCHARGE DIAGNOSIS  Diagnosis: CHF (congestive heart failure)  Assessment and Plan of Treatment: c/w lasix, carvedilol. follow up with cardiologist      SECONDARY DISCHARGE DIAGNOSES  Diagnosis: Adrenal nodule  Assessment and Plan of Treatment: follow up with PMD for MRI    Diagnosis: Essential hypertension  Assessment and Plan of Treatment: continue with amlodipine, hydralazine    Diagnosis: Type 2 diabetes mellitus with hyperglycemia, without long-term current use of insulin  Assessment and Plan of Treatment: continue with insulin    Diagnosis: Hypercholesteremia  Assessment and Plan of Treatment: continue with simvastatin.

## 2020-01-08 NOTE — DISCHARGE NOTE PROVIDER - HOSPITAL COURSE
69 year old female with PMH of HTN, HLD, DM II presented for shortness of breath found to have acute congestve heart failure, Pt improved on lasix. 2D echo EF- 60-65%, G1DD.

## 2020-01-08 NOTE — DISCHARGE NOTE PROVIDER - PROVIDER TOKENS
PROVIDER:[TOKEN:[[id number]],FOLLOWUP:[2 weeks]],FREE:[LAST:[forestal],PHONE:[(   )    -],FAX:[(   )    -],FOLLOWUP:[2 weeks],ESTABLISHEDPATIENT:[T]]

## 2020-01-08 NOTE — PROGRESS NOTE ADULT - SUBJECTIVE AND OBJECTIVE BOX
69 year old female with PMH of HTN, HLD, DM II presenting to ED for SOB on exertion worse over last few days, pt thought she had fever and cough and was seen at [facility] ed w/o f/u few days ago. Pt reports  for past several months she has been more fatigued and SOB. over the past 2 weeks she began having LE edema. and over the past 1 week she noticed Orthopnea. Additionally she reports not sleeping for the past 4 days, unable to specify why. Pt denies any fever, chills,cp, palpitations, n/v/d/c no travels or sick contacts. (2020 07:05)      Chief Complaint:  Patient is a 69y old  Female who presents with a chief complaint of sob (2020 07:05)      Review of Systems:    General:  No wt loss, fevers, chills, night sweats  Eyes:  Good vision, no reported pain  ENT:  No sore throat, pain, runny nose, dysphagia  CV:  No pain, palpitations, hypo/hypertension  Resp:   dyspnea  GI:  No pain, nausea, vomiting, diarrhea, constipation           Social History/Family History  SOCHX:   tobacco,  -  alcohol    FMHX: FA/MO  - contributory       Discussed with:  PMD, Family    Physical Exam:    Vital Signs:  Vital Signs Last 24 Hrs  T(C): 36.3 (2020 16:35), Max: 37.2 (2020 01:24)  T(F): 97.3 (2020 16:35), Max: 99 (2020 01:24)  HR: 64 (2020 16:35) (60 - 77)  BP: 147/55 (2020 16:35) (125/63 - 160/62)  BP(mean): --  RR: 17 (2020 16:35) (16 - 20)  SpO2: 97% (2020 16:35) (95% - 99%)  Daily Height in cm: 157.48 (2020 02:20)    Daily Weight in k.8 (2020 05:19)  I&O's Summary    2020 07:01  -  2020 22:39  --------------------------------------------------------  IN: 750 mL / OUT: 0 mL / NET: 750 mL          Chest:  Full & symmetric excursion, no increased effort, breath sounds clear  Cardiovascular:  Regular rhythm, S1, S2, no murmur/rub/S3/S4, no carotid/femoral/abdominal bruit, radial/pedal pulses 2+,  Abdomen:  Soft, non-tender, non-distended, normoactive bowel sounds, no HSM      Laboratory:                          8.6    4.73  )-----------( 216      ( 2020 06:26 )             26.9     01-05    141  |  106  |  14  ----------------------------<  151<H>  3.9   |  28  |  1.28    Ca    8.7      2020 06:26  Phos  4.5     -05  Mg     2.2     -05    TPro  7.1  /  Alb  3.4  /  TBili  0.4  /  DBili  x   /  AST  23  /  ALT  38  /  AlkPhos  55  01-04      CARDIAC MARKERS ( 2020 09:54 )  <.015 ng/mL / x     / 179 U/L / x     / x      CARDIAC MARKERS ( 2020 06:26 )  .019 ng/mL / x     / x     / x     / x      CARDIAC MARKERS ( 2020 18:09 )  <.015 ng/mL / x     / x     / x     / x          CAPILLARY BLOOD GLUCOSE      POCT Blood Glucose.: 175 mg/dL (2020 21:41)  POCT Blood Glucose.: 102 mg/dL (2020 17:24)  POCT Blood Glucose.: 129 mg/dL (2020 11:49)  POCT Blood Glucose.: 139 mg/dL (2020 08:04)    LIVER FUNCTIONS - ( 2020 18:09 )  Alb: 3.4 g/dL / Pro: 7.1 gm/dL / ALK PHOS: 55 U/L / ALT: 38 U/L / AST: 23 U/L / GGT: x           PT/INR - ( 2020 18:09 )   PT: 11.7 sec;   INR: 1.04 ratio          Assessment:  I am asked to assess this patient with shortness of breath  the patient is also noted to have viral illness  Anemia is also evident  The patient has a mildly elevated BNP, cardiac enzymes remained normal  Lasix continues with some improvement  DC

## 2020-01-08 NOTE — DISCHARGE NOTE NURSING/CASE MANAGEMENT/SOCIAL WORK - PATIENT PORTAL LINK FT
You can access the FollowMyHealth Patient Portal offered by [facility] by registering at the following website: [email or web address]. By joining [email or web address]’s FollowMyHealth portal, you will also be able to view your health information using other applications (apps) compatible with our system.

## 2020-01-08 NOTE — DISCHARGE NOTE PROVIDER - NSDCMRMEDTOKEN_GEN_ALL_CORE_FT
amLODIPine: 10 milligram(s) orally once a day  aspirin 81 mg oral delayed release tablet: 1 tab(s) orally once a day  carvedilol 25 mg oral tablet: 1 tab(s) orally 2 times a day  doxazosin 2 mg oral tablet: 1 tab(s) orally once a day  hydrALAZINE 50 mg oral tablet: 1 tab(s) orally every 8 hours  Lantus: 10 unit(s) subcutaneous  Lasix 20 mg oral tablet: 1 tab(s) orally once a day   latanoprost 0.005% ophthalmic solution: 1 drop(s) to each affected eye once a day (at bedtime)  NovoLO unit(s) subcutaneous  simvastatin: 20 milligram(s) orally once a day (at bedtime)

## 2020-01-14 DIAGNOSIS — E78.00 PURE HYPERCHOLESTEROLEMIA, UNSPECIFIED: ICD-10-CM

## 2020-01-14 DIAGNOSIS — I50.9 HEART FAILURE, UNSPECIFIED: ICD-10-CM

## 2020-01-14 DIAGNOSIS — D50.9 IRON DEFICIENCY ANEMIA, UNSPECIFIED: ICD-10-CM

## 2020-01-14 DIAGNOSIS — N18.9 CHRONIC KIDNEY DISEASE, UNSPECIFIED: ICD-10-CM

## 2020-01-14 DIAGNOSIS — R91.1 SOLITARY PULMONARY NODULE: ICD-10-CM

## 2020-01-14 DIAGNOSIS — E27.9 DISORDER OF ADRENAL GLAND, UNSPECIFIED: ICD-10-CM

## 2020-01-14 DIAGNOSIS — E11.22 TYPE 2 DIABETES MELLITUS WITH DIABETIC CHRONIC KIDNEY DISEASE: ICD-10-CM

## 2020-01-14 DIAGNOSIS — E11.65 TYPE 2 DIABETES MELLITUS WITH HYPERGLYCEMIA: ICD-10-CM

## 2020-01-14 DIAGNOSIS — I13.0 HYPERTENSIVE HEART AND CHRONIC KIDNEY DISEASE WITH HEART FAILURE AND STAGE 1 THROUGH STAGE 4 CHRONIC KIDNEY DISEASE, OR UNSPECIFIED CHRONIC KIDNEY DISEASE: ICD-10-CM

## 2020-01-14 DIAGNOSIS — Z79.4 LONG TERM (CURRENT) USE OF INSULIN: ICD-10-CM

## 2020-01-14 DIAGNOSIS — E44.0 MODERATE PROTEIN-CALORIE MALNUTRITION: ICD-10-CM

## 2020-01-22 DIAGNOSIS — I50.31 ACUTE DIASTOLIC (CONGESTIVE) HEART FAILURE: ICD-10-CM

## 2020-01-22 DIAGNOSIS — N18.2 CHRONIC KIDNEY DISEASE, STAGE 2 (MILD): ICD-10-CM

## 2021-04-08 NOTE — ED ADULT TRIAGE NOTE - CHIEF COMPLAINT QUOTE
pt states she is having sob , body ache and fever for one week. pt was recently seen in [facility] for fluid in the lungs .
oral

## 2022-11-21 NOTE — ED PROVIDER NOTE - CARDIAC, MLM
November 21, 2022      [facility]  [address]  [facility] [address]  Phone: [phone]  Fax: [phone]       Patient: Soraya Elmore   YOB: 1961  Date of Visit: 11/21/2022    To Whom It May Concern:    Raul Elmore  was at [facility] on 11/21/2022. The patient may return to work/school on 11/22/2022 with restrictions. Please allow patient to avoid lifing more than 10 lbs for the next 7 days of work. If you have any questions or concerns, or if I can be of further assistance, please do not hesitate to contact me.    Sincerely,        Liliana Snyder PA-C     
Normal rate, regular rhythm.  Heart sounds S1, S2.  No murmurs, rubs or gallops.

## 2024-12-03 NOTE — DISCHARGE NOTE NURSING/CASE MANAGEMENT/SOCIAL WORK - NSDCPEPT PROEDHF_GEN_ALL_CORE
Low salt diet/Monitor weight daily/Activities as tolerated/Report signs and symptoms to primary care provider/Call primary care provider for follow up after discharge
4 = No assist / stand by assistance

## 2025-01-30 NOTE — ED PROVIDER NOTE - CHIEF COMPLAINT
No answer, unable to leave voicemail.    The patient is a 68y Female complaining of dental pain/injury.